# Patient Record
Sex: FEMALE | Race: ASIAN | Employment: UNEMPLOYED | URBAN - METROPOLITAN AREA
[De-identification: names, ages, dates, MRNs, and addresses within clinical notes are randomized per-mention and may not be internally consistent; named-entity substitution may affect disease eponyms.]

---

## 2017-03-18 ENCOUNTER — HOSPITAL ENCOUNTER (OUTPATIENT)
Dept: MAMMOGRAPHY | Facility: HOSPITAL | Age: 42
Discharge: HOME | End: 2017-03-18
Attending: INTERNAL MEDICINE
Payer: COMMERCIAL

## 2017-03-18 DIAGNOSIS — Z12.31 VISIT FOR SCREENING MAMMOGRAM: ICD-10-CM

## 2017-03-18 PROCEDURE — 77063 BREAST TOMOSYNTHESIS BI: CPT

## 2017-03-18 PROCEDURE — 77067 SCR MAMMO BI INCL CAD: CPT

## 2017-10-11 DIAGNOSIS — Z30.41 ORAL CONTRACEPTIVE USE: ICD-10-CM

## 2017-10-13 NOTE — TELEPHONE ENCOUNTER
From: Apoorva Alvarado  Sent: 10/11/2017 11:06 AM CDT  Subject: Medication Renewal Request    Apoorva Alvarado would like a refill of the following medications:     Norethindrone-Eth Estradiol (ZENCHENT) 0.4-35 MG-MCG Oral Tab Chilango Helm MD]    Preferred pha

## 2017-10-13 NOTE — TELEPHONE ENCOUNTER
Rx request for Norethindrone-Eth Estradiol, FAILED Gynecology Protocol. Please review. Thank you.     Gynecology Medications  Protocol Criteria:  · Appointment scheduled in the past 12 months or the next 3 months  · Pap smear in the past 12 months  · Pap sm

## 2018-01-18 DIAGNOSIS — Z30.41 ORAL CONTRACEPTIVE USE: ICD-10-CM

## 2018-01-19 RX ORDER — NORETHINDRONE AND ETHINYL ESTRADIOL 0.4-0.035
KIT ORAL
Qty: 84 TABLET | Refills: 0 | Status: SHIPPED | OUTPATIENT
Start: 2018-01-19 | End: 2018-03-12

## 2018-01-19 NOTE — TELEPHONE ENCOUNTER
Please advise. This was not the medication sent last time.     Gynecology Medications  Protocol Criteria:  · Appointment scheduled in the past 12 months or the next 3 months  · Pap smear in the past 12 months  · Pap smear WNL manually verified  Rece

## 2018-03-12 ENCOUNTER — OFFICE VISIT (OUTPATIENT)
Dept: INTERNAL MEDICINE CLINIC | Facility: CLINIC | Age: 43
End: 2018-03-12

## 2018-03-12 VITALS
WEIGHT: 227 LBS | HEIGHT: 62 IN | HEART RATE: 98 BPM | SYSTOLIC BLOOD PRESSURE: 130 MMHG | DIASTOLIC BLOOD PRESSURE: 87 MMHG | BODY MASS INDEX: 41.77 KG/M2

## 2018-03-12 DIAGNOSIS — Z00.00 ROUTINE HEALTH MAINTENANCE: Primary | ICD-10-CM

## 2018-03-12 DIAGNOSIS — J45.20 MILD INTERMITTENT ASTHMA WITHOUT COMPLICATION: ICD-10-CM

## 2018-03-12 DIAGNOSIS — Z12.31 VISIT FOR SCREENING MAMMOGRAM: ICD-10-CM

## 2018-03-12 DIAGNOSIS — Z30.41 ORAL CONTRACEPTIVE USE: ICD-10-CM

## 2018-03-12 DIAGNOSIS — E55.9 VITAMIN D DEFICIENCY: ICD-10-CM

## 2018-03-12 PROBLEM — J32.0 CHRONIC MAXILLARY SINUSITIS: Status: ACTIVE | Noted: 2018-03-12

## 2018-03-12 PROCEDURE — 99396 PREV VISIT EST AGE 40-64: CPT | Performed by: INTERNAL MEDICINE

## 2018-03-12 RX ORDER — FLUTICASONE PROPIONATE 50 MCG
2 SPRAY, SUSPENSION (ML) NASAL DAILY
Qty: 3 BOTTLE | Refills: 3 | Status: SHIPPED | OUTPATIENT
Start: 2018-03-12 | End: 2020-09-19

## 2018-03-12 RX ORDER — ALBUTEROL SULFATE 90 UG/1
2 AEROSOL, METERED RESPIRATORY (INHALATION) EVERY 6 HOURS PRN
Qty: 1 INHALER | Refills: 3 | Status: SHIPPED | OUTPATIENT
Start: 2018-03-12 | End: 2020-09-19

## 2018-03-12 NOTE — PROGRESS NOTES
HPI:    Patient ID: Elizabeth Oates is a 36year old female. Pt is here for a physical.      She has very mild asthma which is triggered by the cold. Review of Systems   Constitutional: Negative for chills, diaphoresis and fever.    HENT: Negative fo SINUS                ENDOSCOPY ANTROSTOMIES;  Surgeon: Nieves Greenwood MD;  Location: [address] [place] [address]  12/21/2012: NASAL SCOPY,OPEN MAXILL SINUS      Comment: Procedure: STEREOTACTIC IMAGE GUIDANCE, SINUS Comment: Procedure: STEREOTACTIC IMAGE GUIDANCE, SINUS                ENDOSCOPY ANTROSTOMIES;  Surgeon: Taqueria De Oliveira MD;  Location: [address] [place] [address]         Current Outpatient Prescriptions:  Albuterol S distress. She has no wheezes. She has no rales. Right breast exhibits no inverted nipple, no mass, no nipple discharge and no skin change. Left breast exhibits no inverted nipple, no mass, no nipple discharge and no skin change. Abdominal: Soft.  Bowel so Relevant Orders    JACQUELIN SCREENING BILAT (CPT=77067)              Meds This Visit:  Signed Prescriptions Disp Refills    Albuterol Sulfate HFA (PROAIR HFA) 108 (90 Base) MCG/ACT Inhalation Aero Soln 1 Inhaler 3      Sig: Inhale 2 puffs into the lungs every 6

## 2018-03-12 NOTE — PATIENT INSTRUCTIONS
Follow a low carbohydrate diet. Examples of carbohydrates are sweets, bread, rice, potatoes, and pasta. Eat no more than 100 gm of carbs daily.   You should try to eat more salads with lowfat dressing, steamed vegetables, and broiled or grilled chicken

## 2018-03-12 NOTE — ASSESSMENT & PLAN NOTE
Unremarkable exam.  Counseled regarding exercise and healthy diet. PAP was done 2015  Endocervical cells were present. HPV high-risk DNA was negative.

## 2018-03-30 ENCOUNTER — LAB ENCOUNTER (OUTPATIENT)
Dept: LAB | Age: 43
End: 2018-03-30
Attending: INTERNAL MEDICINE
Payer: COMMERCIAL

## 2018-03-30 ENCOUNTER — HOSPITAL ENCOUNTER (OUTPATIENT)
Dept: MAMMOGRAPHY | Age: 43
Discharge: HOME | End: 2018-03-30
Attending: INTERNAL MEDICINE
Payer: COMMERCIAL

## 2018-03-30 DIAGNOSIS — E55.9 VITAMIN D DEFICIENCY: ICD-10-CM

## 2018-03-30 DIAGNOSIS — Z12.31 VISIT FOR SCREENING MAMMOGRAM: ICD-10-CM

## 2018-03-30 DIAGNOSIS — Z00.00 ROUTINE HEALTH MAINTENANCE: ICD-10-CM

## 2018-03-30 DIAGNOSIS — D64.9 ANEMIA, UNSPECIFIED TYPE: ICD-10-CM

## 2018-03-30 PROCEDURE — 77067 SCR MAMMO BI INCL CAD: CPT | Performed by: INTERNAL MEDICINE

## 2018-03-30 PROCEDURE — 80050 GENERAL HEALTH PANEL: CPT

## 2018-03-30 PROCEDURE — 85025 COMPLETE CBC W/AUTO DIFF WBC: CPT

## 2018-03-30 PROCEDURE — 82728 ASSAY OF FERRITIN: CPT

## 2018-03-30 PROCEDURE — 82306 VITAMIN D 25 HYDROXY: CPT

## 2018-03-30 PROCEDURE — 80061 LIPID PANEL: CPT

## 2018-03-30 PROCEDURE — 36415 COLL VENOUS BLD VENIPUNCTURE: CPT

## 2018-03-30 PROCEDURE — 80053 COMPREHEN METABOLIC PANEL: CPT

## 2018-10-25 ENCOUNTER — OFFICE VISIT (OUTPATIENT)
Dept: INTERNAL MEDICINE CLINIC | Facility: CLINIC | Age: 43
End: 2018-10-25
Payer: COMMERCIAL

## 2018-10-25 VITALS
BODY MASS INDEX: 42.99 KG/M2 | HEIGHT: 61 IN | DIASTOLIC BLOOD PRESSURE: 87 MMHG | HEART RATE: 102 BPM | SYSTOLIC BLOOD PRESSURE: 134 MMHG | WEIGHT: 227.69 LBS

## 2018-10-25 DIAGNOSIS — R05.9 COUGH: Primary | ICD-10-CM

## 2018-10-25 PROCEDURE — 99213 OFFICE O/P EST LOW 20 MIN: CPT | Performed by: PHYSICIAN ASSISTANT

## 2018-10-25 PROCEDURE — 99212 OFFICE O/P EST SF 10 MIN: CPT | Performed by: PHYSICIAN ASSISTANT

## 2018-10-25 RX ORDER — BUDESONIDE AND FORMOTEROL FUMARATE DIHYDRATE 80; 4.5 UG/1; UG/1
2 AEROSOL RESPIRATORY (INHALATION) 2 TIMES DAILY
Qty: 1 INHALER | Refills: 0 | Status: SHIPPED | OUTPATIENT
Start: 2018-10-25 | End: 2018-12-15

## 2018-10-25 RX ORDER — BENZONATATE 100 MG/1
100 CAPSULE ORAL 3 TIMES DAILY PRN
Qty: 30 CAPSULE | Refills: 0 | Status: SHIPPED | OUTPATIENT
Start: 2018-10-25 | End: 2018-12-15 | Stop reason: ALTCHOICE

## 2018-10-25 NOTE — PROGRESS NOTES
HPI:    Patient ID: Leigh Ann Iraheta is a 39year old female. HPI   Patient with history of asthma presents today complaining of cough productive of clear mucus for the last 1.5 weeks.  No wheezing or shortness of breath however she does feel chest tightness PHYSICAL EXAM:   Physical Exam   Nursing note and vitals reviewed. Constitutional: She appears well-developed and well-nourished. HENT:   Head: Normocephalic and atraumatic. Right Ear: External ear normal. Tympanic membrane is erythematous.    Left

## 2018-12-05 ENCOUNTER — APPOINTMENT (OUTPATIENT)
Dept: LAB | Age: 43
End: 2018-12-05
Attending: INTERNAL MEDICINE
Payer: COMMERCIAL

## 2018-12-05 DIAGNOSIS — D64.9 ANEMIA, UNSPECIFIED TYPE: ICD-10-CM

## 2018-12-05 PROCEDURE — 36415 COLL VENOUS BLD VENIPUNCTURE: CPT

## 2018-12-05 PROCEDURE — 84466 ASSAY OF TRANSFERRIN: CPT

## 2018-12-05 PROCEDURE — 83540 ASSAY OF IRON: CPT

## 2018-12-15 ENCOUNTER — MED REC SCAN ONLY (OUTPATIENT)
Dept: INTERNAL MEDICINE CLINIC | Facility: CLINIC | Age: 43
End: 2018-12-15

## 2018-12-15 ENCOUNTER — OFFICE VISIT (OUTPATIENT)
Dept: INTERNAL MEDICINE CLINIC | Facility: CLINIC | Age: 43
End: 2018-12-15
Payer: COMMERCIAL

## 2018-12-15 ENCOUNTER — LAB ENCOUNTER (OUTPATIENT)
Dept: LAB | Age: 43
End: 2018-12-15
Attending: INTERNAL MEDICINE
Payer: COMMERCIAL

## 2018-12-15 VITALS
BODY MASS INDEX: 42.48 KG/M2 | SYSTOLIC BLOOD PRESSURE: 131 MMHG | HEART RATE: 98 BPM | DIASTOLIC BLOOD PRESSURE: 83 MMHG | HEIGHT: 61 IN | WEIGHT: 225 LBS

## 2018-12-15 DIAGNOSIS — J45.20 MILD INTERMITTENT ASTHMA WITHOUT COMPLICATION: ICD-10-CM

## 2018-12-15 DIAGNOSIS — R05.9 COUGH: ICD-10-CM

## 2018-12-15 DIAGNOSIS — M77.12 LATERAL EPICONDYLITIS OF LEFT ELBOW: Primary | ICD-10-CM

## 2018-12-15 DIAGNOSIS — D64.9 ANEMIA, UNSPECIFIED TYPE: ICD-10-CM

## 2018-12-15 DIAGNOSIS — Z30.41 ORAL CONTRACEPTIVE USE: ICD-10-CM

## 2018-12-15 PROBLEM — J32.0 CHRONIC MAXILLARY SINUSITIS: Status: RESOLVED | Noted: 2018-03-12 | Resolved: 2018-12-15

## 2018-12-15 PROCEDURE — 36415 COLL VENOUS BLD VENIPUNCTURE: CPT

## 2018-12-15 PROCEDURE — 85025 COMPLETE CBC W/AUTO DIFF WBC: CPT

## 2018-12-15 PROCEDURE — 90471 IMMUNIZATION ADMIN: CPT | Performed by: INTERNAL MEDICINE

## 2018-12-15 PROCEDURE — 99214 OFFICE O/P EST MOD 30 MIN: CPT | Performed by: INTERNAL MEDICINE

## 2018-12-15 PROCEDURE — 90686 IIV4 VACC NO PRSV 0.5 ML IM: CPT | Performed by: INTERNAL MEDICINE

## 2018-12-15 PROCEDURE — 99212 OFFICE O/P EST SF 10 MIN: CPT | Performed by: INTERNAL MEDICINE

## 2018-12-15 RX ORDER — AZITHROMYCIN 250 MG/1
TABLET, FILM COATED ORAL
Qty: 6 TABLET | Refills: 0 | Status: SHIPPED | OUTPATIENT
Start: 2018-12-15 | End: 2019-01-10 | Stop reason: ALTCHOICE

## 2018-12-15 RX ORDER — MONTELUKAST SODIUM 10 MG/1
10 TABLET ORAL DAILY
Qty: 30 TABLET | Refills: 5 | Status: SHIPPED | OUTPATIENT
Start: 2018-12-15 | End: 2019-03-18

## 2018-12-15 NOTE — ASSESSMENT & PLAN NOTE
Patient has developed lateral epicondylitis which is aggravated by her job. I advised her to try to rest her left arm and to use an arm band. I recommend that she take Aleve as needed. I have advised her to follow-up if she does not improve.

## 2018-12-15 NOTE — PROGRESS NOTES
HPI:    Patient ID: Leigh Ann Iraheta is a 39year old female. Pt had a mild anemia. No heavy periods. She is on the OCP. She had a heavy productive cough which is better, but now she has a tickle-type cough. She didn't try the albuterol.   Pt works at a [employer] (CLARITIN) 10 MG Oral Cap Take  by mouth.  Uses either allegra or claritin Disp:  Rfl:        Allergies:No Known Allergies     Social History    Tobacco Use      Smoking status: Never Smoker      Smokeless tobacco: Never Used    Alcohol use: No Medications    Norethindrone-Eth Estradiol (VYFEMLA) 0.4-35 MG-MCG Oral Tab    Mild intermittent asthma without complication     The patient's asthma has been aggravated by her recent upper respiratory infection.   I advised her to use the albuterol more fr

## 2018-12-15 NOTE — ASSESSMENT & PLAN NOTE
The patient has developed a new anemia this year. Her iron level was normal.  We will recheck the CBC today.

## 2018-12-15 NOTE — ASSESSMENT & PLAN NOTE
Patient had an upper respiratory infection 3-4 weeks ago which improved but now she has a residual but persistent cough. This may be due to her asthma however I am going to give her a Z-Marty to see if that helps at all.   I also advised her to use her inhal

## 2018-12-15 NOTE — PATIENT INSTRUCTIONS
Take Aleve (naproxen), 1 tab twice daily, unless it bothers your stomach. Treating Tennis Elbow    Your treatment will depend on how inflamed your tendon is. The goal is to relieve your symptoms and help you regain full use of your elbow.   Rest and me

## 2018-12-15 NOTE — ASSESSMENT & PLAN NOTE
The patient's asthma has been aggravated by her recent upper respiratory infection. I advised her to use the albuterol more frequently to help with her persistent cough. We will add Singulair as needed.

## 2018-12-18 ENCOUNTER — OFFICE VISIT (OUTPATIENT)
Dept: INTERNAL MEDICINE CLINIC | Facility: CLINIC | Age: 43
End: 2018-12-18
Payer: COMMERCIAL

## 2018-12-18 ENCOUNTER — HOSPITAL ENCOUNTER (OUTPATIENT)
Dept: GENERAL RADIOLOGY | Facility: HOSPITAL | Age: 43
Discharge: HOME | End: 2018-12-18
Attending: INTERNAL MEDICINE
Payer: COMMERCIAL

## 2018-12-18 ENCOUNTER — TELEPHONE (OUTPATIENT)
Dept: OTHER | Age: 43
End: 2018-12-18

## 2018-12-18 VITALS
TEMPERATURE: 98 F | DIASTOLIC BLOOD PRESSURE: 87 MMHG | OXYGEN SATURATION: 98 % | BODY MASS INDEX: 42.67 KG/M2 | HEART RATE: 96 BPM | WEIGHT: 226 LBS | SYSTOLIC BLOOD PRESSURE: 133 MMHG | HEIGHT: 61 IN

## 2018-12-18 DIAGNOSIS — R05.9 COUGH: ICD-10-CM

## 2018-12-18 DIAGNOSIS — R05.9 COUGH: Primary | ICD-10-CM

## 2018-12-18 PROCEDURE — 99212 OFFICE O/P EST SF 10 MIN: CPT | Performed by: INTERNAL MEDICINE

## 2018-12-18 PROCEDURE — 71046 X-RAY EXAM CHEST 2 VIEWS: CPT | Performed by: INTERNAL MEDICINE

## 2018-12-18 PROCEDURE — 99213 OFFICE O/P EST LOW 20 MIN: CPT | Performed by: INTERNAL MEDICINE

## 2018-12-18 RX ORDER — FEXOFENADINE HCL AND PSEUDOEPHEDRINE HCI 60; 120 MG/1; MG/1
1 TABLET, EXTENDED RELEASE ORAL 2 TIMES DAILY
Qty: 60 TABLET | Refills: 0 | Status: SHIPPED | OUTPATIENT
Start: 2018-12-18 | End: 2019-11-07 | Stop reason: ALTCHOICE

## 2018-12-18 RX ORDER — BENZONATATE 100 MG/1
100 CAPSULE ORAL AS NEEDED
COMMUNITY
End: 2019-09-04 | Stop reason: ALTCHOICE

## 2018-12-18 RX ORDER — FEXOFENADINE HCL AND PSEUDOEPHEDRINE HCI 60; 120 MG/1; MG/1
1 TABLET, EXTENDED RELEASE ORAL 2 TIMES DAILY
COMMUNITY
End: 2018-12-18

## 2018-12-18 NOTE — PATIENT INSTRUCTIONS
Await results of today's chest x-ray. Continue current medications. Would consider corticosteroid inhaler if cough persists.

## 2018-12-18 NOTE — PROGRESS NOTES
Benjamín Martino is a 39year old female. Patient presents with:  Cough    HPI:   For approximately 3 months, she has had a persistent nonproductive cough. She also notes occasional postnasal drip and a sore throat.   She in addition has noticed occasional mil Drug use: No       EXAM:   GENERAL: Pleasant female appearing well and breathing comfortably in no distress, not actively coughing  /87 (BP Location: Right arm, Patient Position: Sitting, Cuff Size: large)   Pulse 96   Temp 97.8 °F (36.6 °C) (Oral)

## 2018-12-18 NOTE — TELEPHONE ENCOUNTER
Spoke with patient who reports she saw Dr. Randall Zapata 12/15/18 and was started on medication for a cough. Patient reports she is not better and now she is having intermittent wheezing especially when she coughs.  Patient thinks she might have bronchitis and is

## 2019-02-07 DIAGNOSIS — Z30.41 ORAL CONTRACEPTIVE USE: ICD-10-CM

## 2019-02-08 RX ORDER — NORETHINDRONE AND ETHINYL ESTRADIOL 0.4-0.035
KIT ORAL
Qty: 84 TABLET | Refills: 0 | OUTPATIENT
Start: 2019-02-08

## 2019-03-18 ENCOUNTER — OFFICE VISIT (OUTPATIENT)
Dept: INTERNAL MEDICINE CLINIC | Facility: CLINIC | Age: 44
End: 2019-03-18
Payer: COMMERCIAL

## 2019-03-18 VITALS
SYSTOLIC BLOOD PRESSURE: 127 MMHG | RESPIRATION RATE: 20 BRPM | TEMPERATURE: 98 F | BODY MASS INDEX: 42.69 KG/M2 | HEIGHT: 61 IN | HEART RATE: 99 BPM | DIASTOLIC BLOOD PRESSURE: 84 MMHG | WEIGHT: 226.13 LBS

## 2019-03-18 DIAGNOSIS — D64.9 ANEMIA, UNSPECIFIED TYPE: ICD-10-CM

## 2019-03-18 DIAGNOSIS — J45.20 MILD INTERMITTENT ASTHMA WITHOUT COMPLICATION: ICD-10-CM

## 2019-03-18 DIAGNOSIS — E55.9 VITAMIN D DEFICIENCY: ICD-10-CM

## 2019-03-18 DIAGNOSIS — Z30.41 ORAL CONTRACEPTIVE USE: ICD-10-CM

## 2019-03-18 DIAGNOSIS — E66.01 MORBID OBESITY DUE TO EXCESS CALORIES (HCC): ICD-10-CM

## 2019-03-18 DIAGNOSIS — Z00.00 ROUTINE HEALTH MAINTENANCE: Primary | ICD-10-CM

## 2019-03-18 DIAGNOSIS — Z12.39 BREAST CANCER SCREENING: ICD-10-CM

## 2019-03-18 PROBLEM — M77.12 LATERAL EPICONDYLITIS OF LEFT ELBOW: Status: RESOLVED | Noted: 2018-12-15 | Resolved: 2019-03-18

## 2019-03-18 PROCEDURE — 99212 OFFICE O/P EST SF 10 MIN: CPT | Performed by: INTERNAL MEDICINE

## 2019-03-18 PROCEDURE — 99396 PREV VISIT EST AGE 40-64: CPT | Performed by: INTERNAL MEDICINE

## 2019-03-18 PROCEDURE — 99213 OFFICE O/P EST LOW 20 MIN: CPT | Performed by: INTERNAL MEDICINE

## 2019-03-18 RX ORDER — MONTELUKAST SODIUM 10 MG/1
10 TABLET ORAL DAILY
Qty: 30 TABLET | Refills: 5 | Status: SHIPPED | OUTPATIENT
Start: 2019-03-18 | End: 2020-03-12

## 2019-03-18 NOTE — PROGRESS NOTES
HPI:    Patient ID: Carmina Gage is a 39year old female. Pt is here for a physical.    She has a little bulge on the top of her right knee and it is a little tender. The singulair helped her cough. Her asthma is well controlled.   Pt is taking Vitamin is not nervous/anxious.         Past Surgical History:   Procedure Laterality Date   • APPENDECTOMY     • COLONOSCOPY     • D & C     • STEREOTACTIC IMAGE GUIDANCE, SINUS ENDOSCOPY ANTROSTOMIES Bilateral 12/21/2012    Performed by Michael Clay Normocephalic and atraumatic.    Right Ear: Tympanic membrane normal.   Left Ear: Tympanic membrane normal.   Nose: Nose normal.   Mouth/Throat: Oropharynx is clear and moist.   Eyes: Conjunctivae and EOM are normal. Pupils are equal, round, and reactive to 5000 units daily. Will check level.          Relevant Orders    VITAMIN D, 25-HYDROXY    Oral contraceptive use    Relevant Medications    Norethindrone-Eth Estradiol (VYFEMLA) 0.4-35 MG-MCG Oral Tab    Mild intermittent asthma without complication     Con

## 2019-03-18 NOTE — ASSESSMENT & PLAN NOTE
Unremarkable exam.  Counseled regarding exercise and healthy diet. Labs reviewed with pt. PAP was done in 2015. Due next year.

## 2019-09-04 ENCOUNTER — OFFICE VISIT (OUTPATIENT)
Dept: INTERNAL MEDICINE CLINIC | Facility: CLINIC | Age: 44
End: 2019-09-04
Payer: COMMERCIAL

## 2019-09-04 VITALS
DIASTOLIC BLOOD PRESSURE: 86 MMHG | BODY MASS INDEX: 40.97 KG/M2 | HEIGHT: 61 IN | TEMPERATURE: 98 F | SYSTOLIC BLOOD PRESSURE: 121 MMHG | WEIGHT: 217 LBS | HEART RATE: 75 BPM

## 2019-09-04 DIAGNOSIS — J02.9 SORE THROAT: Primary | ICD-10-CM

## 2019-09-04 DIAGNOSIS — H92.01 RIGHT EAR PAIN: ICD-10-CM

## 2019-09-04 DIAGNOSIS — J02.9 ACUTE PHARYNGITIS, UNSPECIFIED ETIOLOGY: ICD-10-CM

## 2019-09-04 PROBLEM — J06.9 URI (UPPER RESPIRATORY INFECTION): Status: ACTIVE | Noted: 2019-09-04

## 2019-09-04 LAB
CONTROL LINE PRESENT WITH A CLEAR BACKGROUND (YES/NO): YES YES/NO
KIT LOT #: NORMAL NUMERIC
STREP GRP A CUL-SCR: NEGATIVE

## 2019-09-04 PROCEDURE — 99213 OFFICE O/P EST LOW 20 MIN: CPT | Performed by: NURSE PRACTITIONER

## 2019-09-04 PROCEDURE — 87880 STREP A ASSAY W/OPTIC: CPT | Performed by: NURSE PRACTITIONER

## 2019-09-04 RX ORDER — AZITHROMYCIN 250 MG/1
TABLET, FILM COATED ORAL
Qty: 6 TABLET | Refills: 0 | Status: SHIPPED | OUTPATIENT
Start: 2019-09-04 | End: 2019-11-07 | Stop reason: ALTCHOICE

## 2019-09-04 NOTE — PROGRESS NOTES
HPI:    Patient ID: Luis Paez is a 39year old female. HPI Patient here for sore throat pain, swollen glands and right ear pain since Friday. Pain in her right side of her throat is at a 2/10. Pain is intermittent.  Pain is mild with increased fatig MCG/ACT Inhalation Aero Soln Inhale 2 puffs into the lungs every 6 (six) hours as needed for Wheezing. Disp: 1 Inhaler Rfl: 3   Fluticasone Propionate 50 MCG/ACT Nasal Suspension 2 sprays by Nasal route daily.  Uses seasonally only Disp: 3 Bottle Rfl: 3 Relevant Medications    azithromycin (ZITHROMAX Z-RAHAT) 250 MG Oral Tab      Other Visit Diagnoses     Sore throat    -  Primary    Relevant Medications    azithromycin (ZITHROMAX Z-RAHAT) 250 MG Oral Tab    Other Relevant Orders    STREP A ASSAY W/OPTIC (Com

## 2019-09-04 NOTE — ASSESSMENT & PLAN NOTE
A/P Patient with upper respiratory infection complaining of sore throat pain 2/10, swollen glands, and increased fatigue. Her  is receiving chemotherapy and she does not want to get him sick.  She appears fatigued, pain is intermittent in her throat

## 2019-10-19 ENCOUNTER — LAB ENCOUNTER (OUTPATIENT)
Dept: LAB | Age: 44
End: 2019-10-19
Attending: INTERNAL MEDICINE
Payer: COMMERCIAL

## 2019-10-19 DIAGNOSIS — D64.9 ANEMIA, UNSPECIFIED TYPE: ICD-10-CM

## 2019-10-19 DIAGNOSIS — Z00.00 ROUTINE HEALTH MAINTENANCE: ICD-10-CM

## 2019-10-19 PROCEDURE — 36415 COLL VENOUS BLD VENIPUNCTURE: CPT

## 2019-10-19 PROCEDURE — 80053 COMPREHEN METABOLIC PANEL: CPT

## 2019-10-19 PROCEDURE — 83540 ASSAY OF IRON: CPT

## 2019-10-19 PROCEDURE — 82306 VITAMIN D 25 HYDROXY: CPT | Performed by: INTERNAL MEDICINE

## 2019-10-19 PROCEDURE — 84466 ASSAY OF TRANSFERRIN: CPT

## 2019-10-19 PROCEDURE — 84443 ASSAY THYROID STIM HORMONE: CPT

## 2019-10-19 PROCEDURE — 82728 ASSAY OF FERRITIN: CPT

## 2019-10-19 PROCEDURE — 80061 LIPID PANEL: CPT

## 2019-10-19 PROCEDURE — 85025 COMPLETE CBC W/AUTO DIFF WBC: CPT

## 2019-11-06 ENCOUNTER — NURSE TRIAGE (OUTPATIENT)
Dept: INTERNAL MEDICINE CLINIC | Facility: CLINIC | Age: 44
End: 2019-11-06

## 2019-11-06 NOTE — TELEPHONE ENCOUNTER
Action Requested: Summary for Provider     []  Critical Lab, Recommendations Needed  [] Need Additional Advice  [x]   FYI    []   Need Orders  [] Need Medications Sent to Pharmacy  []  Other     SUMMARY: Patient has been having low grade fever since about

## 2019-11-07 ENCOUNTER — OFFICE VISIT (OUTPATIENT)
Dept: INTERNAL MEDICINE CLINIC | Facility: CLINIC | Age: 44
End: 2019-11-07
Payer: COMMERCIAL

## 2019-11-07 ENCOUNTER — HOSPITAL ENCOUNTER (OUTPATIENT)
Dept: GENERAL RADIOLOGY | Facility: HOSPITAL | Age: 44
Discharge: HOME | End: 2019-11-07
Attending: INTERNAL MEDICINE
Payer: COMMERCIAL

## 2019-11-07 ENCOUNTER — LAB ENCOUNTER (OUTPATIENT)
Dept: LAB | Facility: HOSPITAL | Age: 44
End: 2019-11-07
Attending: INTERNAL MEDICINE
Payer: COMMERCIAL

## 2019-11-07 VITALS
TEMPERATURE: 98 F | DIASTOLIC BLOOD PRESSURE: 86 MMHG | WEIGHT: 220.19 LBS | BODY MASS INDEX: 41.57 KG/M2 | HEIGHT: 61 IN | HEART RATE: 114 BPM | SYSTOLIC BLOOD PRESSURE: 122 MMHG

## 2019-11-07 DIAGNOSIS — D64.9 ANEMIA, UNSPECIFIED TYPE: ICD-10-CM

## 2019-11-07 DIAGNOSIS — R50.9 ELEVATED TEMPERATURE: Primary | ICD-10-CM

## 2019-11-07 DIAGNOSIS — R50.9 ELEVATED TEMPERATURE: ICD-10-CM

## 2019-11-07 DIAGNOSIS — R53.83 FATIGUE, UNSPECIFIED TYPE: ICD-10-CM

## 2019-11-07 PROBLEM — J06.9 URI (UPPER RESPIRATORY INFECTION): Status: RESOLVED | Noted: 2019-09-04 | Resolved: 2019-11-07

## 2019-11-07 PROBLEM — R05.9 COUGH: Status: RESOLVED | Noted: 2018-12-15 | Resolved: 2019-11-07

## 2019-11-07 PROCEDURE — 36415 COLL VENOUS BLD VENIPUNCTURE: CPT

## 2019-11-07 PROCEDURE — 83021 HEMOGLOBIN CHROMOTOGRAPHY: CPT

## 2019-11-07 PROCEDURE — 71046 X-RAY EXAM CHEST 2 VIEWS: CPT | Performed by: INTERNAL MEDICINE

## 2019-11-07 PROCEDURE — 99214 OFFICE O/P EST MOD 30 MIN: CPT | Performed by: INTERNAL MEDICINE

## 2019-11-07 PROCEDURE — 84443 ASSAY THYROID STIM HORMONE: CPT

## 2019-11-07 PROCEDURE — 85025 COMPLETE CBC W/AUTO DIFF WBC: CPT

## 2019-11-07 PROCEDURE — 83020 HEMOGLOBIN ELECTROPHORESIS: CPT

## 2019-11-07 PROCEDURE — 86334 IMMUNOFIX E-PHORESIS SERUM: CPT

## 2019-11-07 PROCEDURE — 83036 HEMOGLOBIN GLYCOSYLATED A1C: CPT

## 2019-11-07 PROCEDURE — 84165 PROTEIN E-PHORESIS SERUM: CPT

## 2019-11-07 NOTE — PROGRESS NOTES
HPI:    Patient ID: Fauzia Clarke is a 38year old female. She has had a temp up to 100 for the past month, palpitations, and fatigue. The patient's  has cancer and is undergoing treatment.   Patient is stressed about that and is also concerned th [facility]          Current Outpatient Medications   Medication Sig Dispense Refill   • OMEPRAZOLE 20 MG Oral Capsule Delayed Release TAKE 1 CAPSULE(20 MG) BY MOUTH DAILY 90 capsule 3   • Montelukast Sodium (SINGULAIR) 10 MG Oral Tab Take 1 table normal and breath sounds normal. No respiratory distress. She has no wheezes. She has no rales. Abdominal: Soft. There is no tenderness. Lymphadenopathy:     She has no cervical adenopathy.    Neurological: She is alert and oriented to person, place, an

## 2019-11-08 NOTE — ASSESSMENT & PLAN NOTE
The patient has a mild anemia. She says this is chronic although previous to this it was at the low end of the normal range. Patient may have thalassemia trait. I do not have her old records.

## 2019-11-08 NOTE — ASSESSMENT & PLAN NOTE
Patient complains of fatigue. We will check basic labs. Her fatigue may be due to stress with concerns over her 's health. Follow-up in 4 to 6 weeks.

## 2019-11-08 NOTE — ASSESSMENT & PLAN NOTE
I advised the patient to check her temperature twice a day and record. We will check some labs and a chest x-ray. Patient will call if she has additional symptoms. Follow-up in 4 to 6 weeks.

## 2019-11-13 ENCOUNTER — HOSPITAL ENCOUNTER (OUTPATIENT)
Dept: MAMMOGRAPHY | Age: 44
Discharge: HOME | End: 2019-11-13
Attending: INTERNAL MEDICINE
Payer: COMMERCIAL

## 2019-11-13 DIAGNOSIS — Z12.39 BREAST CANCER SCREENING: ICD-10-CM

## 2019-11-13 PROCEDURE — 77063 BREAST TOMOSYNTHESIS BI: CPT | Performed by: INTERNAL MEDICINE

## 2019-11-13 PROCEDURE — 77067 SCR MAMMO BI INCL CAD: CPT | Performed by: INTERNAL MEDICINE

## 2019-11-20 ENCOUNTER — HOSPITAL ENCOUNTER (OUTPATIENT)
Dept: MAMMOGRAPHY | Facility: HOSPITAL | Age: 44
Discharge: HOME | End: 2019-11-20
Attending: INTERNAL MEDICINE
Payer: COMMERCIAL

## 2019-11-20 ENCOUNTER — HOSPITAL ENCOUNTER (OUTPATIENT)
Dept: ULTRASOUND IMAGING | Facility: HOSPITAL | Age: 44
Discharge: HOME | End: 2019-11-20
Attending: INTERNAL MEDICINE
Payer: COMMERCIAL

## 2019-11-20 DIAGNOSIS — R92.8 ABNORMAL MAMMOGRAM: ICD-10-CM

## 2019-11-20 PROCEDURE — 77062 BREAST TOMOSYNTHESIS BI: CPT | Performed by: INTERNAL MEDICINE

## 2019-11-20 PROCEDURE — 76642 ULTRASOUND BREAST LIMITED: CPT | Performed by: INTERNAL MEDICINE

## 2019-11-20 PROCEDURE — 77066 DX MAMMO INCL CAD BI: CPT | Performed by: INTERNAL MEDICINE

## 2020-06-10 DIAGNOSIS — Z30.41 ORAL CONTRACEPTIVE USE: ICD-10-CM

## 2020-06-12 DIAGNOSIS — Z30.41 ORAL CONTRACEPTIVE USE: ICD-10-CM

## 2020-06-12 RX ORDER — NORETHINDRONE AND ETHINYL ESTRADIOL 0.4-0.035
KIT ORAL
Qty: 28 TABLET | Refills: 0 | Status: SHIPPED | OUTPATIENT
Start: 2020-06-12 | End: 2020-10-23

## 2020-06-12 RX ORDER — NORETHINDRONE AND ETHINYL ESTRADIOL 0.4-0.035
KIT ORAL
Qty: 84 TABLET | Refills: 0 | OUTPATIENT
Start: 2020-06-12

## 2020-06-12 RX ORDER — NORETHINDRONE AND ETHINYL ESTRADIOL 0.4-0.035
KIT ORAL
Qty: 28 TABLET | Refills: 0 | Status: SHIPPED | OUTPATIENT
Start: 2020-06-12 | End: 2020-06-12

## 2020-06-12 NOTE — TELEPHONE ENCOUNTER
Spoke with patient (name and  verified). Informed of message below. States medication is on auto refill. Video visit scheduled for 6/15/2020 1 pm.  Refused office visit. Gave patient the number for Central Scheduling [phone] to schedule Mammogram.    Routed as JAYE.

## 2020-06-12 NOTE — TELEPHONE ENCOUNTER
Please call pt. The pharmacy keeps sending this. Please ask pt to read her message. She is due for a diagnostic mammogram.  It is important that she schedule this. I do not want to fill a 3 month supply of hormones in case there is something on the mammogram.  She is also due for an appointment with me.

## 2020-06-15 ENCOUNTER — TELEMEDICINE (OUTPATIENT)
Dept: INTERNAL MEDICINE CLINIC | Facility: CLINIC | Age: 45
End: 2020-06-15

## 2020-06-15 DIAGNOSIS — E55.9 VITAMIN D DEFICIENCY: ICD-10-CM

## 2020-06-15 DIAGNOSIS — N92.6 IRREGULAR MENSES: ICD-10-CM

## 2020-06-15 DIAGNOSIS — R92.8 ABNORMAL MAMMOGRAM: Primary | ICD-10-CM

## 2020-06-15 DIAGNOSIS — F43.21 GRIEF: ICD-10-CM

## 2020-06-15 DIAGNOSIS — Z00.00 ROUTINE HEALTH MAINTENANCE: ICD-10-CM

## 2020-06-15 DIAGNOSIS — G43.009 MIGRAINE WITHOUT AURA AND WITHOUT STATUS MIGRAINOSUS, NOT INTRACTABLE: ICD-10-CM

## 2020-06-15 PROBLEM — R50.9 ELEVATED TEMPERATURE: Status: RESOLVED | Noted: 2019-11-07 | Resolved: 2020-06-15

## 2020-06-15 PROCEDURE — 99214 OFFICE O/P EST MOD 30 MIN: CPT | Performed by: INTERNAL MEDICINE

## 2020-06-15 NOTE — ASSESSMENT & PLAN NOTE
Patient has been on oral contraceptives for many years because of irregular periods. I suggested to her that she might want to try going off of these for several reasons.   First she is morbidly obese and also has migraines and that increases her risk of b

## 2020-06-15 NOTE — PROGRESS NOTES
Video Progress Note  Telehealth Verbal Consent   I conducted a telehealth visit with Maritza Hair today, 06/15/20, which was completed using two-way, real-time interactive audio and video communication.  This has been done in good eduardo to provide contin problems. Menstrual Problem   The patient's primary symptoms include vaginal bleeding. This is a chronic problem. The current episode started more than 1 year ago. The problem occurs intermittently. The problem has been waxing and waning.  Associated sym is not nervous/anxious. .There were no vitals taken for this visit. PHYSICAL EXAM:   Physical Exam   Nursing note and vitals reviewed. Constitutional: She is oriented to person, place, and time. She appears well-developed and well-nourished.  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 Patient is experiencing normal grief. She gets support from her family and her Mandaen.                  The patient expressed understanding and agreed wit

## 2020-06-15 NOTE — ASSESSMENT & PLAN NOTE
Patient has several types of headaches. She has frequent headaches now due to her allergies. However I advised her that if she does have migraines this does increase the risk of stroke with birth control pills.

## 2020-06-15 NOTE — PATIENT INSTRUCTIONS
See the gynecologist [phone]    Please schedule your next appointment in late October. Do fasting labs 2-4 days before that appointment. Fast for 12 hours. Water and meds are okay. Walk in.

## 2020-06-15 NOTE — ASSESSMENT & PLAN NOTE
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 Patient is experiencing normal grief. She gets support from her family and her Rastafari.

## 2020-06-23 ENCOUNTER — HOSPITAL ENCOUNTER (OUTPATIENT)
Dept: MAMMOGRAPHY | Facility: HOSPITAL | Age: 45
Discharge: HOME | End: 2020-06-23
Attending: INTERNAL MEDICINE
Payer: COMMERCIAL

## 2020-06-23 DIAGNOSIS — R92.8 ABNORMAL MAMMOGRAM: ICD-10-CM

## 2020-06-23 PROCEDURE — 77061 BREAST TOMOSYNTHESIS UNI: CPT | Performed by: INTERNAL MEDICINE

## 2020-06-23 PROCEDURE — 77065 DX MAMMO INCL CAD UNI: CPT | Performed by: INTERNAL MEDICINE

## 2020-09-18 ENCOUNTER — PATIENT MESSAGE (OUTPATIENT)
Dept: INTERNAL MEDICINE CLINIC | Facility: CLINIC | Age: 45
End: 2020-09-18

## 2020-09-18 DIAGNOSIS — J45.20 MILD INTERMITTENT ASTHMA WITHOUT COMPLICATION: ICD-10-CM

## 2020-09-19 RX ORDER — FLUTICASONE PROPIONATE 50 MCG
2 SPRAY, SUSPENSION (ML) NASAL DAILY
Qty: 3 BOTTLE | Refills: 1 | Status: SHIPPED | OUTPATIENT
Start: 2020-09-19

## 2020-09-19 RX ORDER — ALBUTEROL SULFATE 90 UG/1
2 AEROSOL, METERED RESPIRATORY (INHALATION) EVERY 6 HOURS PRN
Qty: 3 INHALER | Refills: 1 | Status: SHIPPED | OUTPATIENT
Start: 2020-09-19

## 2020-09-19 NOTE — TELEPHONE ENCOUNTER
From: Ferny Tidwell  To: Stephanie Castellanos MD  Sent: 2020 3:26 PM CDT  Subject: Prescription Question    [facility] Doctor Charlee Tabares,  I hope you are well.  I needed to get a refill on my inhaler (PROAIR HFA ORAL INH) 200 PFS 8.5 G   my current one has

## 2020-09-19 NOTE — TELEPHONE ENCOUNTER
Routed to Dr Lon Allen for advise, thanks.     Requested Prescriptions     Pending Prescriptions Disp Refills   • Albuterol Sulfate HFA (PROAIR HFA) 108 (90 Base) MCG/ACT Inhalation Aero Soln 1 Inhaler 3     Sig: Inhale 2 puffs into the lungs every 6 (six) ho

## 2020-10-23 ENCOUNTER — OFFICE VISIT (OUTPATIENT)
Dept: OBGYN CLINIC | Facility: CLINIC | Age: 45
End: 2020-10-23
Payer: COMMERCIAL

## 2020-10-23 VITALS
DIASTOLIC BLOOD PRESSURE: 75 MMHG | HEART RATE: 86 BPM | SYSTOLIC BLOOD PRESSURE: 109 MMHG | WEIGHT: 215 LBS | BODY MASS INDEX: 41 KG/M2

## 2020-10-23 DIAGNOSIS — Z01.419 ENCOUNTER FOR GYNECOLOGICAL EXAMINATION: Primary | ICD-10-CM

## 2020-10-23 DIAGNOSIS — Z12.4 SCREENING FOR MALIGNANT NEOPLASM OF CERVIX: ICD-10-CM

## 2020-10-23 PROCEDURE — 3078F DIAST BP <80 MM HG: CPT | Performed by: OBSTETRICS & GYNECOLOGY

## 2020-10-23 PROCEDURE — 99386 PREV VISIT NEW AGE 40-64: CPT | Performed by: OBSTETRICS & GYNECOLOGY

## 2020-10-23 PROCEDURE — 3074F SYST BP LT 130 MM HG: CPT | Performed by: OBSTETRICS & GYNECOLOGY

## 2020-10-23 NOTE — PROGRESS NOTES
Vivien Teran is a 54year old female [id number] Patient's last menstrual period was 10/05/2020. here for annual exam.       New pt. Stopped ocp 4 months ago since pt's   4 months ago. Menses q month. Last mammogram 2020.   Will need to breath  Gastrointestinal:  denies heartburn, abdominal pain, diarrhea or constipation  Genitourinary:  denies dysuria, incontinence, abnormal vaginal discharge, vaginal itching  Musculoskeletal:  denies back pain.   Skin/Breast:  Denies any breast pain, lum this encounter         Piter Virgen MD  10/23/2020  2:53 PM

## 2020-12-01 ENCOUNTER — HOSPITAL ENCOUNTER (OUTPATIENT)
Dept: MAMMOGRAPHY | Facility: HOSPITAL | Age: 45
Discharge: HOME | End: 2020-12-01
Attending: INTERNAL MEDICINE
Payer: COMMERCIAL

## 2020-12-01 DIAGNOSIS — R92.8 ABNORMAL MAMMOGRAM: ICD-10-CM

## 2020-12-01 PROCEDURE — 77062 BREAST TOMOSYNTHESIS BI: CPT | Performed by: INTERNAL MEDICINE

## 2020-12-01 PROCEDURE — 77066 DX MAMMO INCL CAD BI: CPT | Performed by: INTERNAL MEDICINE

## 2021-03-24 ENCOUNTER — TELEPHONE (OUTPATIENT)
Dept: INTERNAL MEDICINE CLINIC | Facility: CLINIC | Age: 46
End: 2021-03-24

## 2021-03-24 NOTE — TELEPHONE ENCOUNTER
Patient asking for a letter stating she is eligible for getting the Covid vaccine due to her comorbidities. States she will bring letter to pharmacy once this vaccination is available to her. Letter pended.

## 2021-04-06 ENCOUNTER — IMMUNIZATION (OUTPATIENT)
Dept: LAB | Facility: HOSPITAL | Age: 46
End: 2021-04-06
Attending: HOSPITALIST
Payer: COMMERCIAL

## 2021-04-06 DIAGNOSIS — Z23 NEED FOR VACCINATION: Primary | ICD-10-CM

## 2021-04-06 PROCEDURE — 0011A SARSCOV2 VAC 100MCG/0.5ML IM: CPT

## 2021-05-04 ENCOUNTER — IMMUNIZATION (OUTPATIENT)
Dept: LAB | Facility: HOSPITAL | Age: 46
End: 2021-05-04
Attending: EMERGENCY MEDICINE
Payer: COMMERCIAL

## 2021-05-04 DIAGNOSIS — Z23 NEED FOR VACCINATION: Primary | ICD-10-CM

## 2021-05-04 PROCEDURE — 0012A SARSCOV2 VAC 100MCG/0.5ML IM: CPT

## 2021-05-08 ENCOUNTER — NURSE TRIAGE (OUTPATIENT)
Dept: INTERNAL MEDICINE CLINIC | Facility: CLINIC | Age: 46
End: 2021-05-08

## 2021-05-08 ENCOUNTER — TELEMEDICINE (OUTPATIENT)
Dept: FAMILY MEDICINE CLINIC | Facility: CLINIC | Age: 46
End: 2021-05-08
Payer: COMMERCIAL

## 2021-05-08 DIAGNOSIS — R05.9 COUGH: Primary | ICD-10-CM

## 2021-05-08 PROCEDURE — 99213 OFFICE O/P EST LOW 20 MIN: CPT | Performed by: FAMILY MEDICINE

## 2021-05-08 RX ORDER — PREDNISONE 10 MG/1
TABLET ORAL
Qty: 18 TABLET | Refills: 0 | Status: SHIPPED | OUTPATIENT
Start: 2021-05-08 | End: 2021-06-08 | Stop reason: ALTCHOICE

## 2021-05-08 RX ORDER — MONTELUKAST SODIUM 10 MG/1
10 TABLET ORAL DAILY
Qty: 30 TABLET | Refills: 0 | Status: SHIPPED | OUTPATIENT
Start: 2021-05-08 | End: 2021-05-11

## 2021-05-08 NOTE — TELEPHONE ENCOUNTER
Action Requested: Summary for Provider     []  Critical Lab, Recommendations Needed  [] Need Additional Advice  [x]   FYI    []   Need Orders  [] Need Medications Sent to Pharmacy  []  Other     SUMMARY: Patient calling with complaint of cough with wheezin

## 2021-05-11 RX ORDER — MONTELUKAST SODIUM 10 MG/1
TABLET ORAL
Qty: 90 TABLET | Refills: 0 | Status: SHIPPED | OUTPATIENT
Start: 2021-05-11 | End: 2021-06-08

## 2021-06-08 ENCOUNTER — OFFICE VISIT (OUTPATIENT)
Dept: INTERNAL MEDICINE CLINIC | Facility: CLINIC | Age: 46
End: 2021-06-08
Payer: COMMERCIAL

## 2021-06-08 VITALS
HEIGHT: 61 IN | BODY MASS INDEX: 42.46 KG/M2 | RESPIRATION RATE: 18 BRPM | WEIGHT: 224.88 LBS | DIASTOLIC BLOOD PRESSURE: 74 MMHG | HEART RATE: 96 BPM | SYSTOLIC BLOOD PRESSURE: 106 MMHG

## 2021-06-08 DIAGNOSIS — J30.9 ALLERGIC RHINITIS, UNSPECIFIED SEASONALITY, UNSPECIFIED TRIGGER: ICD-10-CM

## 2021-06-08 DIAGNOSIS — J45.30 MILD PERSISTENT ASTHMA WITHOUT COMPLICATION: Primary | ICD-10-CM

## 2021-06-08 PROCEDURE — 3008F BODY MASS INDEX DOCD: CPT | Performed by: INTERNAL MEDICINE

## 2021-06-08 PROCEDURE — 99214 OFFICE O/P EST MOD 30 MIN: CPT | Performed by: INTERNAL MEDICINE

## 2021-06-08 PROCEDURE — 3074F SYST BP LT 130 MM HG: CPT | Performed by: INTERNAL MEDICINE

## 2021-06-08 PROCEDURE — 3078F DIAST BP <80 MM HG: CPT | Performed by: INTERNAL MEDICINE

## 2021-06-08 RX ORDER — MONTELUKAST SODIUM 10 MG/1
10 TABLET ORAL DAILY
Qty: 90 TABLET | Refills: 1 | Status: SHIPPED | OUTPATIENT
Start: 2021-06-08

## 2021-06-08 NOTE — PROGRESS NOTES
HPI:    Patient ID: Kadi Olmstead is a 54year old female. HPI:  Pt's asthma has been bad. She had a video appointment with another doctor and was given oral steroids which helped, but now her asthma is bad again.     Past Surgical History:   Procedur Procedure: STEREOTACTIC IMAGE GUIDANCE, SINUS ENDOSCOPY ANTROSTOMIES;  Surgeon: Bereket Modi MD;  Location: [address] [place] [address]   • NASAL/SINUS SCOPY,V MIKI BULL  12/21/2012    Procedure: STEREOTACTIC IMAGE GUIDANCE, SINUS ENDOSC ./74 (BP Location: Right arm, Patient Position: Sitting, Cuff Size: large)   Pulse 96   Resp 18   Ht 5' 1\" (1.549 m)   Wt 224 lb 14.4 oz (102 kg)   LMP 05/20/2021 (Within Days)   Breastfeeding No   BMI 42.49 kg/m²   PHYSICAL EXAM:   Physical Exa

## 2021-06-08 NOTE — PATIENT INSTRUCTIONS
Do fasting labs soon. Fast for 12 hours. Water is okay. You can walk-in or schedule an appointment.   Do not take vitamins or supplements for 3 days prior to the blood test.

## 2021-06-11 RX ORDER — MONTELUKAST SODIUM 10 MG/1
10 TABLET ORAL DAILY
Qty: 90 TABLET | Refills: 0 | OUTPATIENT
Start: 2021-06-11

## 2021-08-24 ENCOUNTER — TELEPHONE (OUTPATIENT)
Dept: INTERNAL MEDICINE CLINIC | Facility: CLINIC | Age: 46
End: 2021-08-24

## 2021-08-24 DIAGNOSIS — Z00.00 ROUTINE HEALTH MAINTENANCE: Primary | ICD-10-CM

## 2021-08-24 DIAGNOSIS — E55.9 VITAMIN D DEFICIENCY: ICD-10-CM

## 2021-08-24 NOTE — TELEPHONE ENCOUNTER
Pt calling requesting if she can have refil for Montelukast Sodium (SINGULAIR) 10 MG Oral Tab    And If it could be a monthly refill. Please advise.

## 2021-08-24 NOTE — TELEPHONE ENCOUNTER
Pt called stating due to insurance issues she never got a chance to do the labs from 6/15/2020 that Dr order for her. She is requesting if she can have new orders for those labs. Please advise.

## 2021-08-25 NOTE — TELEPHONE ENCOUNTER
Lab Order Update  Message [id number]  From   Fanny Horta RN To   Joshua Self and Delivered   8/24/2021  9:28 PM   Last Read in [address]   8/24/2021 11:00 PM by Kadi Olmstead

## 2021-08-25 NOTE — TELEPHONE ENCOUNTER
Ratify message sent to patient, please verify it is viewed. If not, please call patient with the update.

## 2021-08-25 NOTE — TELEPHONE ENCOUNTER
Patient was informed of message. Disp Refills Start End     Montelukast Sodium 10 MG Oral Tab 90 tablet 1 6/8/2021     Sig - Route:  Take 1 tablet (10 mg total) by mouth daily. - Oral    Sent to pharmacy as: Montelukast Sodium 10 MG Oral Tablet (DARI

## 2021-10-09 ENCOUNTER — LAB ENCOUNTER (OUTPATIENT)
Dept: LAB | Facility: HOSPITAL | Age: 46
End: 2021-10-09
Attending: INTERNAL MEDICINE
Payer: COMMERCIAL

## 2021-10-09 DIAGNOSIS — Z00.00 ROUTINE HEALTH MAINTENANCE: ICD-10-CM

## 2021-10-09 DIAGNOSIS — E55.9 VITAMIN D DEFICIENCY: ICD-10-CM

## 2021-10-09 PROCEDURE — 82306 VITAMIN D 25 HYDROXY: CPT

## 2021-10-09 PROCEDURE — 84443 ASSAY THYROID STIM HORMONE: CPT

## 2021-10-09 PROCEDURE — 85025 COMPLETE CBC W/AUTO DIFF WBC: CPT

## 2021-10-09 PROCEDURE — 83036 HEMOGLOBIN GLYCOSYLATED A1C: CPT

## 2021-10-09 PROCEDURE — 80061 LIPID PANEL: CPT

## 2021-10-09 PROCEDURE — 36415 COLL VENOUS BLD VENIPUNCTURE: CPT

## 2021-10-09 PROCEDURE — 80053 COMPREHEN METABOLIC PANEL: CPT

## 2022-02-05 ENCOUNTER — IMMUNIZATION (OUTPATIENT)
Dept: LAB | Age: 47
End: 2022-02-05
Attending: EMERGENCY MEDICINE
Payer: COMMERCIAL

## 2022-02-05 ENCOUNTER — HOSPITAL ENCOUNTER (OUTPATIENT)
Dept: MAMMOGRAPHY | Facility: HOSPITAL | Age: 47
Discharge: HOME | End: 2022-02-05
Attending: INTERNAL MEDICINE
Payer: COMMERCIAL

## 2022-02-05 ENCOUNTER — APPOINTMENT (OUTPATIENT)
Dept: LAB | Age: 47
End: 2022-02-05
Attending: EMERGENCY MEDICINE
Payer: COMMERCIAL

## 2022-02-05 DIAGNOSIS — Z12.31 BREAST CANCER SCREENING BY MAMMOGRAM: ICD-10-CM

## 2022-02-05 DIAGNOSIS — Z23 NEED FOR VACCINATION: Primary | ICD-10-CM

## 2022-02-05 PROCEDURE — 0054A SARSCOV2 VAC 30MCG TRS SUCR: CPT

## 2022-02-05 PROCEDURE — 77063 BREAST TOMOSYNTHESIS BI: CPT | Performed by: INTERNAL MEDICINE

## 2022-02-05 PROCEDURE — 77067 SCR MAMMO BI INCL CAD: CPT | Performed by: INTERNAL MEDICINE

## 2022-02-08 ENCOUNTER — PATIENT MESSAGE (OUTPATIENT)
Dept: INTERNAL MEDICINE CLINIC | Facility: CLINIC | Age: 47
End: 2022-02-08

## 2022-02-09 NOTE — TELEPHONE ENCOUNTER
The note from the 10/21 labs indicates that you wanted the pt to retest her A1c. Pt has been taking the OTC vitamin D but I was wondering if you wanted to retest the Vitamin D  Also to rule out a vitamin deficiency cause for the pt's hair loss. Pt is willing to get the labs and then f/u with an appt but states that she can only come in the evening or Saturdays. Could I add her on a Monday evening in the next couple of weeks?

## 2022-02-13 ENCOUNTER — LAB ENCOUNTER (OUTPATIENT)
Dept: LAB | Facility: HOSPITAL | Age: 47
End: 2022-02-13
Attending: INTERNAL MEDICINE
Payer: COMMERCIAL

## 2022-02-13 DIAGNOSIS — E55.9 VITAMIN D DEFICIENCY: ICD-10-CM

## 2022-02-13 DIAGNOSIS — R73.03 PREDIABETES: ICD-10-CM

## 2022-02-13 LAB
EST. AVERAGE GLUCOSE BLD GHB EST-MCNC: 128 MG/DL (ref 68–126)
HBA1C MFR BLD: 6.1 % (ref ?–5.7)
VIT D+METAB SERPL-MCNC: 35.5 NG/ML (ref 30–100)

## 2022-02-13 PROCEDURE — 82306 VITAMIN D 25 HYDROXY: CPT

## 2022-02-13 PROCEDURE — 36415 COLL VENOUS BLD VENIPUNCTURE: CPT

## 2022-02-13 PROCEDURE — 83036 HEMOGLOBIN GLYCOSYLATED A1C: CPT

## 2022-02-19 ENCOUNTER — LAB ENCOUNTER (OUTPATIENT)
Dept: LAB | Age: 47
End: 2022-02-19
Attending: INTERNAL MEDICINE
Payer: COMMERCIAL

## 2022-02-19 ENCOUNTER — OFFICE VISIT (OUTPATIENT)
Dept: INTERNAL MEDICINE CLINIC | Facility: CLINIC | Age: 47
End: 2022-02-19
Payer: COMMERCIAL

## 2022-02-19 VITALS
SYSTOLIC BLOOD PRESSURE: 119 MMHG | HEIGHT: 61 IN | HEART RATE: 91 BPM | WEIGHT: 219.13 LBS | RESPIRATION RATE: 18 BRPM | DIASTOLIC BLOOD PRESSURE: 77 MMHG | BODY MASS INDEX: 41.37 KG/M2

## 2022-02-19 DIAGNOSIS — Z12.11 COLON CANCER SCREENING: ICD-10-CM

## 2022-02-19 DIAGNOSIS — N92.0 MENORRHAGIA WITH REGULAR CYCLE: ICD-10-CM

## 2022-02-19 DIAGNOSIS — L21.9 SEBORRHEIC DERMATITIS: ICD-10-CM

## 2022-02-19 DIAGNOSIS — D64.9 ANEMIA, UNSPECIFIED TYPE: ICD-10-CM

## 2022-02-19 DIAGNOSIS — L65.9 HAIR LOSS: Primary | ICD-10-CM

## 2022-02-19 LAB
DEPRECATED HBV CORE AB SER IA-ACNC: 41.9 NG/ML
FOLATE SERPL-MCNC: 8.8 NG/ML (ref 8.7–?)
IRON SATN MFR SERPL: 12 %
IRON SERPL-MCNC: 49 UG/DL
TIBC SERPL-MCNC: 404 UG/DL (ref 240–450)
TRANSFERRIN SERPL-MCNC: 271 MG/DL (ref 200–360)
VIT B12 SERPL-MCNC: 1007 PG/ML (ref 193–986)

## 2022-02-19 PROCEDURE — 3078F DIAST BP <80 MM HG: CPT | Performed by: INTERNAL MEDICINE

## 2022-02-19 PROCEDURE — 36415 COLL VENOUS BLD VENIPUNCTURE: CPT

## 2022-02-19 PROCEDURE — 99214 OFFICE O/P EST MOD 30 MIN: CPT | Performed by: INTERNAL MEDICINE

## 2022-02-19 PROCEDURE — 90686 IIV4 VACC NO PRSV 0.5 ML IM: CPT | Performed by: INTERNAL MEDICINE

## 2022-02-19 PROCEDURE — 82746 ASSAY OF FOLIC ACID SERUM: CPT

## 2022-02-19 PROCEDURE — 82607 VITAMIN B-12: CPT

## 2022-02-19 PROCEDURE — 82728 ASSAY OF FERRITIN: CPT

## 2022-02-19 PROCEDURE — 90471 IMMUNIZATION ADMIN: CPT | Performed by: INTERNAL MEDICINE

## 2022-02-19 PROCEDURE — 83540 ASSAY OF IRON: CPT

## 2022-02-19 PROCEDURE — 3008F BODY MASS INDEX DOCD: CPT | Performed by: INTERNAL MEDICINE

## 2022-02-19 PROCEDURE — 84466 ASSAY OF TRANSFERRIN: CPT

## 2022-02-19 PROCEDURE — 3074F SYST BP LT 130 MM HG: CPT | Performed by: INTERNAL MEDICINE

## 2022-02-19 RX ORDER — KETOCONAZOLE 20 MG/ML
SHAMPOO TOPICAL
Qty: 120 ML | Refills: 3 | Status: SHIPPED | OUTPATIENT
Start: 2022-02-19

## 2022-02-19 NOTE — ASSESSMENT & PLAN NOTE
Patient has a mild chronic anemia. Work-up was negative for thalassemia trait or other hereditary anemia. Patient's anemia is most likely due to her heavy periods, however she is due for screening colonoscopy and we discussed the rationale for this. I will check her CBC and iron levels today.

## 2022-02-19 NOTE — ASSESSMENT & PLAN NOTE
Patient has been losing hair recently. She has a history of chronic severe seborrhea, which she does not treat on a regular basis. I will prescribe ketoconazole shampoo and I urged her to use this. Patient would also like to see the dermatologist.  Check ferritin level.

## 2022-08-06 DIAGNOSIS — J45.30 MILD PERSISTENT ASTHMA WITHOUT COMPLICATION: ICD-10-CM

## 2022-08-07 RX ORDER — MONTELUKAST SODIUM 10 MG/1
10 TABLET ORAL DAILY
Qty: 90 TABLET | Refills: 0 | Status: SHIPPED | OUTPATIENT
Start: 2022-08-07

## 2022-09-13 ENCOUNTER — TELEPHONE (OUTPATIENT)
Dept: INTERNAL MEDICINE CLINIC | Facility: CLINIC | Age: 47
End: 2022-09-13

## 2022-10-03 ENCOUNTER — PATIENT MESSAGE (OUTPATIENT)
Dept: INTERNAL MEDICINE CLINIC | Facility: CLINIC | Age: 47
End: 2022-10-03

## 2022-10-04 ENCOUNTER — OFFICE VISIT (OUTPATIENT)
Facility: CLINIC | Age: 47
End: 2022-10-04
Payer: COMMERCIAL

## 2022-10-04 ENCOUNTER — HOSPITAL ENCOUNTER (OUTPATIENT)
Dept: GENERAL RADIOLOGY | Age: 47
Discharge: HOME | End: 2022-10-04
Attending: INTERNAL MEDICINE
Payer: COMMERCIAL

## 2022-10-04 VITALS
HEIGHT: 61 IN | RESPIRATION RATE: 20 BRPM | HEART RATE: 95 BPM | DIASTOLIC BLOOD PRESSURE: 70 MMHG | SYSTOLIC BLOOD PRESSURE: 112 MMHG | WEIGHT: 231 LBS | OXYGEN SATURATION: 98 % | BODY MASS INDEX: 43.61 KG/M2

## 2022-10-04 DIAGNOSIS — R73.03 PREDIABETES: ICD-10-CM

## 2022-10-04 DIAGNOSIS — J45.20 MILD INTERMITTENT ASTHMA WITHOUT COMPLICATION: ICD-10-CM

## 2022-10-04 DIAGNOSIS — J45.30 MILD PERSISTENT ASTHMA WITHOUT COMPLICATION: ICD-10-CM

## 2022-10-04 DIAGNOSIS — K21.9 GASTROESOPHAGEAL REFLUX DISEASE, UNSPECIFIED WHETHER ESOPHAGITIS PRESENT: ICD-10-CM

## 2022-10-04 DIAGNOSIS — Z12.11 COLON CANCER SCREENING: ICD-10-CM

## 2022-10-04 DIAGNOSIS — R05.3 CHRONIC COUGH: Primary | ICD-10-CM

## 2022-10-04 PROCEDURE — 3074F SYST BP LT 130 MM HG: CPT | Performed by: INTERNAL MEDICINE

## 2022-10-04 PROCEDURE — 90677 PCV20 VACCINE IM: CPT | Performed by: INTERNAL MEDICINE

## 2022-10-04 PROCEDURE — 90472 IMMUNIZATION ADMIN EACH ADD: CPT | Performed by: INTERNAL MEDICINE

## 2022-10-04 PROCEDURE — 3078F DIAST BP <80 MM HG: CPT | Performed by: INTERNAL MEDICINE

## 2022-10-04 PROCEDURE — 3008F BODY MASS INDEX DOCD: CPT | Performed by: INTERNAL MEDICINE

## 2022-10-04 PROCEDURE — 90471 IMMUNIZATION ADMIN: CPT | Performed by: INTERNAL MEDICINE

## 2022-10-04 PROCEDURE — 99214 OFFICE O/P EST MOD 30 MIN: CPT | Performed by: INTERNAL MEDICINE

## 2022-10-04 PROCEDURE — 90686 IIV4 VACC NO PRSV 0.5 ML IM: CPT | Performed by: INTERNAL MEDICINE

## 2022-10-04 PROCEDURE — 71046 X-RAY EXAM CHEST 2 VIEWS: CPT | Performed by: INTERNAL MEDICINE

## 2022-10-04 RX ORDER — ALBUTEROL SULFATE 90 UG/1
2 AEROSOL, METERED RESPIRATORY (INHALATION) EVERY 6 HOURS PRN
Qty: 8 G | Refills: 3 | Status: SHIPPED | OUTPATIENT
Start: 2022-10-04

## 2022-10-04 RX ORDER — OMEPRAZOLE 40 MG/1
CAPSULE, DELAYED RELEASE ORAL
Qty: 60 CAPSULE | Refills: 0 | Status: SHIPPED | OUTPATIENT
Start: 2022-10-04 | End: 2022-11-04

## 2022-10-04 RX ORDER — MONTELUKAST SODIUM 10 MG/1
10 TABLET ORAL DAILY
Qty: 90 TABLET | Refills: 0 | Status: SHIPPED | OUTPATIENT
Start: 2022-10-04

## 2022-10-04 NOTE — ASSESSMENT & PLAN NOTE
Patient's cough has been worse and persistent. She is coughing during the visit. We will treat for both asthma and GERD and follow-up in a few weeks.

## 2022-10-04 NOTE — ASSESSMENT & PLAN NOTE
Patient thinks that the cough is due to GERD, however it has an asthmatic sounding component and is mildly improved with the albuterol. We will give her a trial of higher dose omeprazole for a couple of weeks.   She will also see the gastroenterologist.

## 2022-10-10 ENCOUNTER — LAB ENCOUNTER (OUTPATIENT)
Dept: LAB | Age: 47
End: 2022-10-10
Attending: INTERNAL MEDICINE
Payer: COMMERCIAL

## 2022-10-10 DIAGNOSIS — J45.30 MILD PERSISTENT ASTHMA WITHOUT COMPLICATION: ICD-10-CM

## 2022-10-11 LAB — SARS-COV-2 RNA RESP QL NAA+PROBE: NOT DETECTED

## 2022-10-13 ENCOUNTER — HOSPITAL ENCOUNTER (OUTPATIENT)
Dept: RESPIRATORY THERAPY | Facility: HOSPITAL | Age: 47
Discharge: HOME | End: 2022-10-13
Attending: INTERNAL MEDICINE
Payer: COMMERCIAL

## 2022-10-13 DIAGNOSIS — J45.30 MILD PERSISTENT ASTHMA WITHOUT COMPLICATION: ICD-10-CM

## 2022-10-13 PROCEDURE — 94726 PLETHYSMOGRAPHY LUNG VOLUMES: CPT

## 2022-10-13 PROCEDURE — 94729 DIFFUSING CAPACITY: CPT

## 2022-10-13 PROCEDURE — 94060 EVALUATION OF WHEEZING: CPT

## 2022-10-18 NOTE — PROCEDURES
[address] [facility] 1975 MRN [id number]   Height  58 inh  Age 49year old   Weight  231 lbs  Sex Female         Spirometry:   FEV1 2.44 L which is 93%  FEV1/FVC ratio 83% which is normal    No significant bronchodilator response      FVL:   Normal      Lung Volume:   Within normal limits  TLC 4.29 L is 91%  VC 2.94 L which is 96%  RV/TLC ratio is normal      DLCO:   80%      Impression:   Normal study        Thank you for allowing me to participate in the care of your patient. lEen Kapadia.  William Nicholson MD  10/17/2022  9:30 PM

## 2022-10-18 NOTE — ADDENDUM NOTE
Encounter addended by: Slade Salas MD on: 10/17/2022 9:31 PM   Actions taken: Clinical Note Signed, Charge Capture section accepted

## 2022-12-31 ENCOUNTER — TELEPHONE (OUTPATIENT)
Dept: INTERNAL MEDICINE CLINIC | Facility: CLINIC | Age: 47
End: 2022-12-31

## 2023-01-01 NOTE — ASSESSMENT & PLAN NOTE
I recommend that patient start inhaled steroids. Explained to patient that it takes several weeks for her to start work. Continue montelukast.  Patient will call if she feels she needs oral steroids again. Follow-up in 3 months.
Patient takes over-the-counter Allegra. Continue present management.
Immunizations:    hepatitis B IntraMuscular Vaccine - Peds: ( @ 13:19)      Synagis:       Screenings:    Latest CCHD screen:      Latest car seat screen:      Latest hearing screen:  Right ear hearing screen completed date: 2023  Right ear screen method: EOAE (evoked otoacoustic emission)  Right ear screen result: Passed  Right ear screen comment: N/A    Left ear hearing screen completed date: 2023  Left ear screen method: EOAE (evoked otoacoustic emission)  Left ear screen result: Passed  Left ear screen comments: N/A       screen:  Screen#: [id number]  Screen Date: 2023  Screen Comment: N/A    Screen#: [id number]  Screen Date: 2023  Screen Comment: N/A

## 2023-10-19 ENCOUNTER — OFFICE VISIT (OUTPATIENT)
Dept: INTERNAL MEDICINE CLINIC | Facility: CLINIC | Age: 48
End: 2023-10-19
Payer: COMMERCIAL

## 2023-10-19 VITALS
SYSTOLIC BLOOD PRESSURE: 116 MMHG | HEART RATE: 110 BPM | OXYGEN SATURATION: 98 % | RESPIRATION RATE: 18 BRPM | WEIGHT: 240 LBS | BODY MASS INDEX: 45.31 KG/M2 | HEIGHT: 61 IN | DIASTOLIC BLOOD PRESSURE: 80 MMHG

## 2023-10-19 DIAGNOSIS — Z00.00 ROUTINE HEALTH MAINTENANCE: Primary | ICD-10-CM

## 2023-10-19 DIAGNOSIS — E66.01 MORBID OBESITY DUE TO EXCESS CALORIES (HCC): ICD-10-CM

## 2023-10-19 DIAGNOSIS — J45.20 MILD INTERMITTENT ASTHMA WITHOUT COMPLICATION: ICD-10-CM

## 2023-10-19 DIAGNOSIS — R73.03 PREDIABETES: ICD-10-CM

## 2023-10-19 PROBLEM — R53.83 FATIGUE: Status: RESOLVED | Noted: 2019-11-07 | Resolved: 2023-10-19

## 2023-10-19 PROBLEM — N92.6 IRREGULAR MENSES: Status: RESOLVED | Noted: 2020-06-15 | Resolved: 2023-10-19

## 2023-10-19 PROBLEM — R92.8 ABNORMAL MAMMOGRAM: Status: RESOLVED | Noted: 2020-06-15 | Resolved: 2023-10-19

## 2023-10-19 PROBLEM — F43.21 GRIEF: Status: RESOLVED | Noted: 2020-06-15 | Resolved: 2023-10-19

## 2023-10-19 PROBLEM — Z30.41 ORAL CONTRACEPTIVE USE: Status: RESOLVED | Noted: 2018-03-12 | Resolved: 2023-10-19

## 2023-10-19 LAB
CARTRIDGE EXPIRATION DATE: ABNORMAL DATE
CARTRIDGE LOT#: 612 NUMERIC
HEMOGLOBIN A1C: 6.1 % (ref 4.3–5.6)

## 2023-10-19 PROCEDURE — 90471 IMMUNIZATION ADMIN: CPT | Performed by: INTERNAL MEDICINE

## 2023-10-19 PROCEDURE — 83036 HEMOGLOBIN GLYCOSYLATED A1C: CPT | Performed by: INTERNAL MEDICINE

## 2023-10-19 PROCEDURE — 3079F DIAST BP 80-89 MM HG: CPT | Performed by: INTERNAL MEDICINE

## 2023-10-19 PROCEDURE — 90686 IIV4 VACC NO PRSV 0.5 ML IM: CPT | Performed by: INTERNAL MEDICINE

## 2023-10-19 PROCEDURE — 99213 OFFICE O/P EST LOW 20 MIN: CPT | Performed by: INTERNAL MEDICINE

## 2023-10-19 PROCEDURE — 3074F SYST BP LT 130 MM HG: CPT | Performed by: INTERNAL MEDICINE

## 2023-10-19 PROCEDURE — 3008F BODY MASS INDEX DOCD: CPT | Performed by: INTERNAL MEDICINE

## 2023-10-19 PROCEDURE — 99396 PREV VISIT EST AGE 40-64: CPT | Performed by: INTERNAL MEDICINE

## 2023-10-19 RX ORDER — ALBUTEROL SULFATE 90 UG/1
2 AEROSOL, METERED RESPIRATORY (INHALATION) EVERY 6 HOURS PRN
Qty: 8 G | Refills: 3 | Status: SHIPPED | OUTPATIENT
Start: 2023-10-19

## 2023-10-19 NOTE — PATIENT INSTRUCTIONS
My last day will  be January 11, 2024.   I recommend the following providers:    [facility] (Internal Medicine):  [phone]  Ravi Tesfaye

## 2023-11-15 DIAGNOSIS — J45.30 MILD PERSISTENT ASTHMA WITHOUT COMPLICATION: ICD-10-CM

## 2023-11-17 RX ORDER — MONTELUKAST SODIUM 10 MG/1
10 TABLET ORAL DAILY
Qty: 90 TABLET | Refills: 3 | Status: SHIPPED | OUTPATIENT
Start: 2023-11-17

## 2023-11-17 NOTE — TELEPHONE ENCOUNTER
Refill passed per [place] FRS, [facility] protocol. Please review. Last prescribed 10/2022. Thank you. Rose Donita Requested Prescriptions   Pending Prescriptions Disp Refills    montelukast 10 MG Oral Tab 90 tablet 0     Sig: Take 1 tablet (10 mg total) by mouth daily.        Asthma & COPD Medication Protocol Passed - 11/15/2023  4:59 PM        Passed - In person appointment or virtual visit in the past 6 mos or appointment in next 3 mos     Recent Outpatient Visits              4 weeks ago Routine health maintenance    [address] [facility], Reuben Hussein MD    Office Visit    1 year ago Chronic cough    Delfino Ortiz MD    Office Visit    1 year ago [facility], [facility] Reuben Ogden MD    Office Visit    2 years ago Mild persistent asthma without complication    [address], [address] [facility], Kamran Arboleda MD    Office Visit    2 years ago Cough    [address], [address] [facility], [facility] [place], [place]    Telemedicine          Future Appointments         Provider Department Appt Notes    In 1 month [facility] Outpatient Visits              4 weeks ago Routine health maintenance    [address], [facility], [place] Reuben Ogden MD    Office Visit    1 year ago Chronic cough    Delfino Ortiz MD    Office Visit    1 year ago Hair loss    [address] [facility], Jose Stewart MD    Office Visit    2 years ago Mild persistent asthma without complication    [address] [facility], Kamran Arboleda MD    Office Visit    2 years ago Cough    [facility], [address] [place] [address]    Telemedicine            Future Appointments         Provider Department Appt Notes    In 1 month [address]

## 2023-12-02 DIAGNOSIS — J45.20 MILD INTERMITTENT ASTHMA WITHOUT COMPLICATION: ICD-10-CM

## 2023-12-04 RX ORDER — ALBUTEROL SULFATE 90 UG/1
2 AEROSOL, METERED RESPIRATORY (INHALATION) EVERY 6 HOURS PRN
Qty: 8 G | Refills: 3 | Status: SHIPPED | OUTPATIENT
Start: 2023-12-04

## 2024-01-03 ENCOUNTER — HOSPITAL ENCOUNTER (OUTPATIENT)
Dept: MAMMOGRAPHY | Age: 49
Discharge: HOME | End: 2024-01-03
Attending: INTERNAL MEDICINE
Payer: COMMERCIAL

## 2024-01-03 DIAGNOSIS — Z12.31 ENCOUNTER FOR MAMMOGRAM TO ESTABLISH BASELINE MAMMOGRAM: ICD-10-CM

## 2024-01-03 PROCEDURE — 77067 SCR MAMMO BI INCL CAD: CPT | Performed by: INTERNAL MEDICINE

## 2024-01-03 PROCEDURE — 77063 BREAST TOMOSYNTHESIS BI: CPT | Performed by: INTERNAL MEDICINE

## 2024-02-24 ENCOUNTER — LAB ENCOUNTER (OUTPATIENT)
Dept: LAB | Facility: HOSPITAL | Age: 49
End: 2024-02-24
Attending: INTERNAL MEDICINE
Payer: COMMERCIAL

## 2024-02-24 DIAGNOSIS — Z00.00 ROUTINE HEALTH MAINTENANCE: ICD-10-CM

## 2024-02-24 DIAGNOSIS — R73.01 ABNORMAL FASTING GLUCOSE: ICD-10-CM

## 2024-02-24 LAB
ALBUMIN SERPL-MCNC: 4.3 G/DL (ref 3.2–4.8)
ALBUMIN/GLOB SERPL: 1.2 {RATIO} (ref 1–2)
ALP LIVER SERPL-CCNC: 65 U/L
ALT SERPL-CCNC: 42 U/L
ANION GAP SERPL CALC-SCNC: 8 MMOL/L (ref 0–18)
AST SERPL-CCNC: 53 U/L (ref ?–34)
BASOPHILS # BLD AUTO: 0.02 X10(3) UL (ref 0–0.2)
BASOPHILS NFR BLD AUTO: 0.2 %
BILIRUB SERPL-MCNC: 0.3 MG/DL (ref 0.3–1.2)
BUN BLD-MCNC: 8 MG/DL (ref 9–23)
BUN/CREAT SERPL: 10.3 (ref 10–20)
CALCIUM BLD-MCNC: 9.6 MG/DL (ref 8.7–10.4)
CHLORIDE SERPL-SCNC: 104 MMOL/L (ref 98–112)
CHOLEST SERPL-MCNC: 161 MG/DL (ref ?–200)
CO2 SERPL-SCNC: 27 MMOL/L (ref 21–32)
CREAT BLD-MCNC: 0.78 MG/DL
DEPRECATED RDW RBC AUTO: 39.6 FL (ref 35.1–46.3)
EGFRCR SERPLBLD CKD-EPI 2021: 93 ML/MIN/1.73M2 (ref 60–?)
EOSINOPHIL # BLD AUTO: 0.39 X10(3) UL (ref 0–0.7)
EOSINOPHIL NFR BLD AUTO: 3.2 %
ERYTHROCYTE [DISTWIDTH] IN BLOOD BY AUTOMATED COUNT: 14 % (ref 11–15)
FASTING PATIENT LIPID ANSWER: YES
FASTING STATUS PATIENT QL REPORTED: YES
GLOBULIN PLAS-MCNC: 3.6 G/DL (ref 2.8–4.4)
GLUCOSE BLD-MCNC: 107 MG/DL (ref 70–99)
HCT VFR BLD AUTO: 37 %
HDLC SERPL-MCNC: 41 MG/DL (ref 40–59)
HGB BLD-MCNC: 11.5 G/DL
IMM GRANULOCYTES # BLD AUTO: 0.08 X10(3) UL (ref 0–1)
IMM GRANULOCYTES NFR BLD: 0.6 %
LDLC SERPL CALC-MCNC: 86 MG/DL (ref ?–100)
LYMPHOCYTES # BLD AUTO: 3.1 X10(3) UL (ref 1–4)
LYMPHOCYTES NFR BLD AUTO: 25.1 %
MCH RBC QN AUTO: 24.3 PG (ref 26–34)
MCHC RBC AUTO-ENTMCNC: 31.1 G/DL (ref 31–37)
MCV RBC AUTO: 78.2 FL
MONOCYTES # BLD AUTO: 0.49 X10(3) UL (ref 0.1–1)
MONOCYTES NFR BLD AUTO: 4 %
NEUTROPHILS # BLD AUTO: 8.27 X10 (3) UL (ref 1.5–7.7)
NEUTROPHILS # BLD AUTO: 8.27 X10(3) UL (ref 1.5–7.7)
NEUTROPHILS NFR BLD AUTO: 66.9 %
NONHDLC SERPL-MCNC: 120 MG/DL (ref ?–130)
OSMOLALITY SERPL CALC.SUM OF ELEC: 287 MOSM/KG (ref 275–295)
PLATELET # BLD AUTO: 355 10(3)UL (ref 150–450)
POTASSIUM SERPL-SCNC: 4.1 MMOL/L (ref 3.5–5.1)
PROT SERPL-MCNC: 7.9 G/DL (ref 5.7–8.2)
RBC # BLD AUTO: 4.73 X10(6)UL
SODIUM SERPL-SCNC: 139 MMOL/L (ref 136–145)
TRIGL SERPL-MCNC: 198 MG/DL (ref 30–149)
TSI SER-ACNC: 2.15 MIU/ML (ref 0.55–4.78)
VLDLC SERPL CALC-MCNC: 32 MG/DL (ref 0–30)
WBC # BLD AUTO: 12.4 X10(3) UL (ref 4–11)

## 2024-02-24 PROCEDURE — 83036 HEMOGLOBIN GLYCOSYLATED A1C: CPT

## 2024-02-24 PROCEDURE — 80053 COMPREHEN METABOLIC PANEL: CPT

## 2024-02-24 PROCEDURE — 85025 COMPLETE CBC W/AUTO DIFF WBC: CPT

## 2024-02-24 PROCEDURE — 84443 ASSAY THYROID STIM HORMONE: CPT

## 2024-02-24 PROCEDURE — 36415 COLL VENOUS BLD VENIPUNCTURE: CPT

## 2024-02-24 PROCEDURE — 80061 LIPID PANEL: CPT

## 2024-02-26 DIAGNOSIS — R73.01 ABNORMAL FASTING GLUCOSE: Primary | ICD-10-CM

## 2024-02-26 LAB
EST. AVERAGE GLUCOSE BLD GHB EST-MCNC: 151 MG/DL (ref 68–126)
HBA1C MFR BLD: 6.9 % (ref ?–5.7)

## 2024-10-30 ENCOUNTER — OFFICE VISIT (OUTPATIENT)
Dept: INTERNAL MEDICINE CLINIC | Facility: CLINIC | Age: 49
End: 2024-10-30
Payer: COMMERCIAL

## 2024-10-30 VITALS
RESPIRATION RATE: 18 BRPM | BODY MASS INDEX: 43.97 KG/M2 | HEART RATE: 101 BPM | DIASTOLIC BLOOD PRESSURE: 82 MMHG | SYSTOLIC BLOOD PRESSURE: 118 MMHG | WEIGHT: 242 LBS | HEIGHT: 62.21 IN

## 2024-10-30 DIAGNOSIS — E11.9 TYPE 2 DIABETES MELLITUS WITHOUT COMPLICATION, WITHOUT LONG-TERM CURRENT USE OF INSULIN (HCC): ICD-10-CM

## 2024-10-30 DIAGNOSIS — E66.01 CLASS 3 SEVERE OBESITY DUE TO EXCESS CALORIES WITH SERIOUS COMORBIDITY AND BODY MASS INDEX (BMI) OF 40.0 TO 44.9 IN ADULT (HCC): ICD-10-CM

## 2024-10-30 DIAGNOSIS — L65.9 FALLING HAIR: ICD-10-CM

## 2024-10-30 DIAGNOSIS — E66.813 CLASS 3 SEVERE OBESITY DUE TO EXCESS CALORIES WITH SERIOUS COMORBIDITY AND BODY MASS INDEX (BMI) OF 40.0 TO 44.9 IN ADULT (HCC): ICD-10-CM

## 2024-10-30 DIAGNOSIS — Z12.11 SCREENING FOR COLON CANCER: ICD-10-CM

## 2024-10-30 DIAGNOSIS — Z00.00 ANNUAL PHYSICAL EXAM: Primary | ICD-10-CM

## 2024-10-30 DIAGNOSIS — Z12.31 ENCOUNTER FOR SCREENING MAMMOGRAM FOR MALIGNANT NEOPLASM OF BREAST: ICD-10-CM

## 2024-10-30 PROCEDURE — 99396 PREV VISIT EST AGE 40-64: CPT | Performed by: INTERNAL MEDICINE

## 2024-10-30 PROCEDURE — 90471 IMMUNIZATION ADMIN: CPT | Performed by: INTERNAL MEDICINE

## 2024-10-30 PROCEDURE — 90472 IMMUNIZATION ADMIN EACH ADD: CPT | Performed by: INTERNAL MEDICINE

## 2024-10-30 PROCEDURE — 3044F HG A1C LEVEL LT 7.0%: CPT | Performed by: INTERNAL MEDICINE

## 2024-10-30 PROCEDURE — 3074F SYST BP LT 130 MM HG: CPT | Performed by: INTERNAL MEDICINE

## 2024-10-30 PROCEDURE — 90750 HZV VACC RECOMBINANT IM: CPT | Performed by: INTERNAL MEDICINE

## 2024-10-30 PROCEDURE — 3008F BODY MASS INDEX DOCD: CPT | Performed by: INTERNAL MEDICINE

## 2024-10-30 PROCEDURE — 3079F DIAST BP 80-89 MM HG: CPT | Performed by: INTERNAL MEDICINE

## 2024-10-30 PROCEDURE — 90656 IIV3 VACC NO PRSV 0.5 ML IM: CPT | Performed by: INTERNAL MEDICINE

## 2024-10-30 NOTE — PROGRESS NOTES
Crista Ortiz is a 49 year old female.  Chief Complaint   Patient presents with    Physical     HPI:    Patient presented today for annual physical. She states that she has been having increased hair fall. It improved with some home remedies but now it has started again.     Her hemoglobin A1c was elevated in diabetic range back in feb but she missed the [employer] message and has not followed up yet.    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 8 g 3    montelukast 10 MG Oral Tab Take 1 tablet (10 mg total) by mouth daily. (Patient not taking: Reported on 10/30/2024) 90 tablet 3    ketoconazole (NIZORAL) 2 % External Shampoo Use 3 times weekly.  Leave on 5 min, then rinse. (Patient not taking: Reported on 10/30/2024) 120 mL 3      Past Medical History:    Anemia    Migraine      Past Surgical History:   Procedure Laterality Date    Appendectomy      Colonoscopy      D & c      Excision turbinate,submucous  12/21/2012    Procedure: STEREOTACTIC IMAGE GUIDANCE, SINUS ENDOSCOPY ANTROSTOMIES;  Surgeon: Corina Clayton MD;  Location: [facility]    Excision turbinate,submucous  12/21/2012    Procedure: STEREOTACTIC IMAGE GUIDANCE, SINUS ENDOSCOPY ANTROSTOMIES;  Surgeon: Corina Clayton MD;  Location: [facility]    Nasal scopy,explor frontal sinus  12/21/2012    Procedure: STEREOTACTIC IMAGE GUIDANCE, SINUS ENDOSCOPY ANTROSTOMIES;  Surgeon: Corina Clayton MD;  Location: [facility]    Nasal scopy,explor frontal sinus  12/21/2012    Procedure: STEREOTACTIC IMAGE GUIDANCE, SINUS ENDOSCOPY ANTROSTOMIES;  Surgeon: Corina Clayton MD;  Location: [facility]    Nasal  scopy,open maxill sinus  12/21/2012    Procedure: STEREOTACTIC IMAGE GUIDANCE, SINUS ENDOSCOPY ANTROSTOMIES;  Surgeon: Corina Clayton MD;  Location: [facility]    Nasal scopy,remv totl ethmoid  12/21/2012    Procedure: STEREOTACTIC IMAGE GUIDANCE, SINUS ENDOSCOPY ANTROSTOMIES;  Surgeon: Corina Clayton MD;  Location: [facility]    Nasal scopy,remv totl ethmoid  12/21/2012    Procedure: STEREOTACTIC IMAGE GUIDANCE, SINUS ENDOSCOPY ANTROSTOMIES;  Surgeon: Corina Clayton MD;  Location: [facility]    Nasal scopy,rmv tiss maxill sinus  12/21/2012    Procedure: STEREOTACTIC IMAGE GUIDANCE, SINUS ENDOSCOPY ANTROSTOMIES;  Surgeon: Corina Clayton MD;  Location: [facility]    Nasal scopy,sphenoidotomy  12/21/2012    Procedure: STEREOTACTIC IMAGE GUIDANCE, SINUS ENDOSCOPY ANTROSTOMIES;  Surgeon: Corina Clayton MD;  Location: [facility]    Nasal/sinus scopy,rmv jus bull  12/21/2012    Procedure: STEREOTACTIC IMAGE GUIDANCE, SINUS ENDOSCOPY ANTROSTOMIES;  Surgeon: Corina Claytno MD;  Location: [facility]    Repair of nasal septum  12/21/2012    Procedure: STEREOTACTIC IMAGE GUIDANCE, SINUS ENDOSCOPY ANTROSTOMIES;  Surgeon: Corina Clayton MD;  Location: [facility]    Scan proc cranial extra  12/21/2012    Procedure: STEREOTACTIC IMAGE GUIDANCE, SINUS ENDOSCOPY ANTROSTOMIES;  Surgeon: Corina Clayton MD;  Location: [facility]      Social History:  Social History     Socioeconomic History    Marital status:    Tobacco Use    Smoking status: Never    Smokeless tobacco: Never   Vaping Use    Vaping status: Never Used   Substance and Sexual Activity    Alcohol use: No    Drug use: No    Sexual activity: Not Currently   Other Topics Concern    Reaction to local anesthetic No   Social History 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   HENT: Negative for congestion and voice change.    Respiratory: Negative for cough and shortness of breath.    Cardiovascular: Negative for chest pain.   Gastrointestinal: Negative for abdominal distention, abdominal pain and vomiting.   Genitourinary: Negative for hematuria.   Skin: Negative for wound.   Psychiatric/Behavioral: Negative for behavioral problems.   Wt Readings from Last 5 Encounters:   10/30/24 242 lb (109.8 kg)   10/19/23 240 lb (108.9 kg)   10/04/22 231 lb (104.8 kg)   02/19/22 219 lb 1.6 oz (99.4 kg)   06/08/21 224 lb 14.4 oz (102 kg)     Body mass index is 43.97 kg/m².      EXAM:   /82   Pulse 101   Resp 18   Ht 5' 2.21\" (1.58 m)   Wt 242 lb (109.8 kg)   LMP 10/24/2024 (Approximate)   BMI 43.97 kg/m²   Physical Exam   Constitutional:       Appearance: Normal appearance.   HENT:      Head: Normocephalic.   Eyes:      Conjunctiva/sclera: Conjunctivae normal.   Cardiovascular:      Rate and Rhythm: Normal rate and regular rhythm.      Heart sounds: Normal heart sounds. No murmur heard.  Pulmonary:      Effort: Pulmonary effort is normal.      Breath sounds: Normal breath sounds. No rhonchi or rales.   Abdominal:      General: Bowel sounds are normal.      Palpations: Abdomen is soft.      Tenderness: There is no abdominal tenderness.   Musculoskeletal:      Cervical back: Neck supple.      Right lower leg: No edema.      Left lower leg: No edema.   Skin:     General: Skin is warm and dry.   Neurological:      General: No  focal deficit present.      Mental Status: He is alert and oriented to person, place, and time. Mental status is at baseline.   Psychiatric:         Mood and Affect: Mood normal.         Behavior: Behavior normal.       ASSESSMENT AND PLAN:   1. Annual physical exam  - check annual fasting labs  - immunizations reviewed. Flu and shingles shot given today.   - diet and exercise counseling  - Comp Metabolic Panel (14); Future  - Lipid Panel; Future  - TSH W Reflex To Free T4; Future  - Vitamin D; Future  - CBC With Differential With Platelet; Future    2. Encounter for screening mammogram for malignant neoplasm of breast  - [facility] MOUNA 2D+3D SCREENING BILAT (CPT=77067/51986); Future    3. Type 2 diabetes mellitus without complication, without long-term current use of insulin (HCC)  4- obesity. BMI 43.9  - detailed discussion regarding diabetes diagnosis  - dietary and exercise modifications discussed  - start ozempic  - check labs  - start checking blood glucose daily in am and keep a log  - metformin also discussed but wants to try one medication at a time  - Hemoglobin A1C [E]; Future  - Microalb/Creat Ratio, Random Urine; Future    4. Screening for colon cancer  - GASTRO - INTERNAL    5. Falling hair  - check vitamin D  - start biotin supplement  - Iron And Tibc [E]; Future  - Ferritin [E]; Future      The patient indicates understanding of these issues and agrees to the plan.  Follow up in 3 months  Cordelia Fox MD        [1] No Active Allergies

## 2024-10-31 ENCOUNTER — TELEPHONE (OUTPATIENT)
Dept: INTERNAL MEDICINE CLINIC | Facility: CLINIC | Age: 49
End: 2024-10-31

## 2024-10-31 DIAGNOSIS — E11.9 TYPE 2 DIABETES MELLITUS WITHOUT COMPLICATION, WITHOUT LONG-TERM CURRENT USE OF INSULIN (HCC): Primary | ICD-10-CM

## 2024-10-31 RX ORDER — BLOOD-GLUCOSE CONTROL, NORMAL
EACH MISCELLANEOUS
Qty: 1 EACH | Refills: 5 | Status: SHIPPED | OUTPATIENT
Start: 2024-10-31

## 2024-10-31 RX ORDER — DIPHENHYDRAMINE HYDROCHLORIDE 25 MG/1
CAPSULE, LIQUID FILLED ORAL
Qty: 1 KIT | Refills: 0 | Status: SHIPPED | OUTPATIENT
Start: 2024-10-31

## 2024-10-31 RX ORDER — LANCETS 30 GAUGE
EACH MISCELLANEOUS
Qty: 100 EACH | Refills: 5 | Status: SHIPPED | OUTPATIENT
Start: 2024-10-31

## 2024-10-31 RX ORDER — BLOOD SUGAR DIAGNOSTIC
1 STRIP MISCELLANEOUS DAILY
Qty: 100 STRIP | Refills: 5 | Status: SHIPPED | OUTPATIENT
Start: 2024-10-31

## 2024-10-31 NOTE — TELEPHONE ENCOUNTER
Diabetic supplies cannot be entered into [employer] as DME.  Script pended for pharmacy, please review and advise if appropriate.

## 2024-11-02 ENCOUNTER — LAB ENCOUNTER (OUTPATIENT)
Dept: LAB | Age: 49
End: 2024-11-02
Attending: INTERNAL MEDICINE
Payer: COMMERCIAL

## 2024-11-02 DIAGNOSIS — L65.9 FALLING HAIR: ICD-10-CM

## 2024-11-02 DIAGNOSIS — E11.9 TYPE 2 DIABETES MELLITUS WITHOUT COMPLICATION, WITHOUT LONG-TERM CURRENT USE OF INSULIN (HCC): ICD-10-CM

## 2024-11-02 DIAGNOSIS — Z00.00 ANNUAL PHYSICAL EXAM: ICD-10-CM

## 2024-11-02 LAB
ALBUMIN SERPL-MCNC: 4.4 G/DL (ref 3.2–4.8)
ALBUMIN/GLOB SERPL: 1.3 {RATIO} (ref 1–2)
ALP LIVER SERPL-CCNC: 64 U/L
ALT SERPL-CCNC: 40 U/L
ANION GAP SERPL CALC-SCNC: 8 MMOL/L (ref 0–18)
AST SERPL-CCNC: 57 U/L (ref ?–34)
BASOPHILS # BLD AUTO: 0.03 X10(3) UL (ref 0–0.2)
BASOPHILS NFR BLD AUTO: 0.3 %
BILIRUB SERPL-MCNC: 0.3 MG/DL (ref 0.3–1.2)
BUN BLD-MCNC: 8 MG/DL (ref 9–23)
BUN/CREAT SERPL: 10.1 (ref 10–20)
CALCIUM BLD-MCNC: 10 MG/DL (ref 8.7–10.4)
CHLORIDE SERPL-SCNC: 104 MMOL/L (ref 98–112)
CHOLEST SERPL-MCNC: 171 MG/DL (ref ?–200)
CO2 SERPL-SCNC: 26 MMOL/L (ref 21–32)
CREAT BLD-MCNC: 0.79 MG/DL
CREAT UR-SCNC: 232.8 MG/DL
DEPRECATED HBV CORE AB SER IA-ACNC: 40 NG/ML
DEPRECATED RDW RBC AUTO: 41.1 FL (ref 35.1–46.3)
EGFRCR SERPLBLD CKD-EPI 2021: 91 ML/MIN/1.73M2 (ref 60–?)
EOSINOPHIL # BLD AUTO: 0.44 X10(3) UL (ref 0–0.7)
EOSINOPHIL NFR BLD AUTO: 3.9 %
ERYTHROCYTE [DISTWIDTH] IN BLOOD BY AUTOMATED COUNT: 14.7 % (ref 11–15)
EST. AVERAGE GLUCOSE BLD GHB EST-MCNC: 163 MG/DL (ref 68–126)
FASTING PATIENT LIPID ANSWER: YES
FASTING STATUS PATIENT QL REPORTED: YES
GLOBULIN PLAS-MCNC: 3.4 G/DL (ref 2–3.5)
GLUCOSE BLD-MCNC: 135 MG/DL (ref 70–99)
HBA1C MFR BLD: 7.3 % (ref ?–5.7)
HCT VFR BLD AUTO: 38.6 %
HDLC SERPL-MCNC: 48 MG/DL (ref 40–59)
HGB BLD-MCNC: 11.6 G/DL
IMM GRANULOCYTES # BLD AUTO: 0.08 X10(3) UL (ref 0–1)
IMM GRANULOCYTES NFR BLD: 0.7 %
IRON SATN MFR SERPL: 10 %
IRON SERPL-MCNC: 46 UG/DL
LDLC SERPL CALC-MCNC: 89 MG/DL (ref ?–100)
LYMPHOCYTES # BLD AUTO: 2.52 X10(3) UL (ref 1–4)
LYMPHOCYTES NFR BLD AUTO: 22.1 %
MCH RBC QN AUTO: 23.2 PG (ref 26–34)
MCHC RBC AUTO-ENTMCNC: 30.1 G/DL (ref 31–37)
MCV RBC AUTO: 77.4 FL
MICROALBUMIN UR-MCNC: 60.5 MG/DL
MICROALBUMIN/CREAT 24H UR-RTO: 259.9 UG/MG (ref ?–30)
MONOCYTES # BLD AUTO: 0.53 X10(3) UL (ref 0.1–1)
MONOCYTES NFR BLD AUTO: 4.7 %
NEUTROPHILS # BLD AUTO: 7.79 X10 (3) UL (ref 1.5–7.7)
NEUTROPHILS # BLD AUTO: 7.79 X10(3) UL (ref 1.5–7.7)
NEUTROPHILS NFR BLD AUTO: 68.3 %
NONHDLC SERPL-MCNC: 123 MG/DL (ref ?–130)
OSMOLALITY SERPL CALC.SUM OF ELEC: 286 MOSM/KG (ref 275–295)
PLATELET # BLD AUTO: 376 10(3)UL (ref 150–450)
POTASSIUM SERPL-SCNC: 4.4 MMOL/L (ref 3.5–5.1)
PROT SERPL-MCNC: 7.8 G/DL (ref 5.7–8.2)
RBC # BLD AUTO: 4.99 X10(6)UL
SODIUM SERPL-SCNC: 138 MMOL/L (ref 136–145)
TIBC SERPL-MCNC: 443 UG/DL (ref 250–425)
TRANSFERRIN SERPL-MCNC: 297 MG/DL (ref 250–380)
TRIGL SERPL-MCNC: 198 MG/DL (ref 30–149)
TSI SER-ACNC: 2.57 UIU/ML (ref 0.55–4.78)
VIT D+METAB SERPL-MCNC: 23.9 NG/ML (ref 30–100)
VLDLC SERPL CALC-MCNC: 32 MG/DL (ref 0–30)
WBC # BLD AUTO: 11.4 X10(3) UL (ref 4–11)

## 2024-11-02 PROCEDURE — 83036 HEMOGLOBIN GLYCOSYLATED A1C: CPT

## 2024-11-02 PROCEDURE — 82043 UR ALBUMIN QUANTITATIVE: CPT

## 2024-11-02 PROCEDURE — 80053 COMPREHEN METABOLIC PANEL: CPT

## 2024-11-02 PROCEDURE — 80061 LIPID PANEL: CPT

## 2024-11-02 PROCEDURE — 84443 ASSAY THYROID STIM HORMONE: CPT

## 2024-11-02 PROCEDURE — 82728 ASSAY OF FERRITIN: CPT

## 2024-11-02 PROCEDURE — 36415 COLL VENOUS BLD VENIPUNCTURE: CPT

## 2024-11-02 PROCEDURE — 83540 ASSAY OF IRON: CPT

## 2024-11-02 PROCEDURE — 85025 COMPLETE CBC W/AUTO DIFF WBC: CPT

## 2024-11-02 PROCEDURE — 82306 VITAMIN D 25 HYDROXY: CPT

## 2024-11-02 PROCEDURE — 82570 ASSAY OF URINE CREATININE: CPT

## 2024-11-02 PROCEDURE — 84466 ASSAY OF TRANSFERRIN: CPT

## 2024-11-05 ENCOUNTER — TELEPHONE (OUTPATIENT)
Dept: INTERNAL MEDICINE CLINIC | Facility: CLINIC | Age: 49
End: 2024-11-05

## 2024-11-05 RX ORDER — SEMAGLUTIDE 0.68 MG/ML
0.25 INJECTION, SOLUTION SUBCUTANEOUS WEEKLY
Qty: 1 EACH | Refills: 1 | Status: SHIPPED | OUTPATIENT
Start: 2024-11-05 | End: 2024-11-11

## 2024-11-05 RX ORDER — FERROUS SULFATE 325(65) MG
325 TABLET ORAL
Qty: 90 TABLET | Refills: 1 | Status: SHIPPED | OUTPATIENT
Start: 2024-11-05

## 2024-11-05 NOTE — TELEPHONE ENCOUNTER
Pt called back  Spoke with patient (identified name and ), results reviewed and agrees with plan.  Appt applied for 24  Will call back if need PA for Ozempic

## 2024-11-05 NOTE — TELEPHONE ENCOUNTER
Please notify patient. She has missed Mychart messages prviousely,     Your labs were reviewed.   [id number] is elevated to 7.3. Please start ozempic 0.25 mg once a week as we discussed. Prescription sent.   Start checking your blood glucose once a day before breakfast and keep a log.   Iron is low. Please start taking ferrous sulfate, once a day.   Vitamin D is low. Please start taking vitamin D 1000 international units  orally daily  Triglycerides are high. This can improve with low carb diet. Increase acitvity.  Follow up in office in 1 month with your blood glucose log.   Thank you   Cordelia Fox M.D.

## 2024-11-08 ENCOUNTER — TELEPHONE (OUTPATIENT)
Dept: INTERNAL MEDICINE CLINIC | Facility: CLINIC | Age: 49
End: 2024-11-08

## 2024-11-08 NOTE — TELEPHONE ENCOUNTER
Disp Refills Start End    semaglutide (OZEMPIC, 0.25 OR 0.5 MG/DOSE,) 2 MG/3ML Subcutaneous Solution Pen-injector 1 each 1 11/5/2024 --    Sig - Route: Inject 0.25 mg into the skin once a week. - Subcutaneous    Sent to pharmacy as: Ozempic (0.25 or 0.5 MG/DOSE) 2 MG/3ML Subcutaneous Solution Pen-injector (semaglutide)    E-Prescribing Status: Receipt confirmed by pharmacy (11/5/2024 11:29 AM CST)      Pharmacy    [id number] - [place], [place] - [address] AT [facility], [phone], [phone]

## 2024-11-08 NOTE — TELEPHONE ENCOUNTER
semaglutide (OZEMPIC, 0.25 OR 0.5 MG/DOSE,) 2 MG/3ML Subcutaneous Solution Pen-injector, Inject 0.25 mg into the skin once a week., Disp: 1 each, Rfl: 1

## 2024-11-09 ENCOUNTER — TELEPHONE (OUTPATIENT)
Dept: INTERNAL MEDICINE CLINIC | Facility: CLINIC | Age: 49
End: 2024-11-09

## 2024-11-09 NOTE — TELEPHONE ENCOUNTER
Prior authorization request for:      semaglutide (OZEMPIC, 0.25 OR 0.5 MG/DOSE,) 2 MG/3ML Subcutaneous Solution Pen-injector, Inject 0.25 mg into the skin once a week., Disp: 1 each, Rfl: 1    [email or web address]    Jessika: [email or web address]  Patient's last name: Diana  Patient's YOB: 1975

## 2024-11-11 RX ORDER — SEMAGLUTIDE 0.68 MG/ML
0.25 INJECTION, SOLUTION SUBCUTANEOUS WEEKLY
Qty: 3 ML | Refills: 1 | Status: SHIPPED | OUTPATIENT
Start: 2024-11-11

## 2024-11-15 ENCOUNTER — NURSE ONLY (OUTPATIENT)
Facility: CLINIC | Age: 49
End: 2024-11-15

## 2024-11-15 NOTE — PROGRESS NOTES
Patient did not meet the direct scheduling from the telephone colon screening due to abnormal lab results (anemia). Provided patient with office visit location, date, and time. Patient is aware to arrive early for check in process.

## 2024-11-15 NOTE — PROGRESS NOTES
8/23/2018  Arrived for CTA coronary arteries.  Creatinine (mg/dL)   Date Value   08/20/2018 0.78     GFR Estimate, Non  (no units)   Date Value   08/20/2018 >90     Did not take any pre beta blockers prior to scan.   Heart rate on arrival 57 - 59.    After completion of scanning instructed on increasing oral fluid intake post IV contrast. IV left in place for ECHO. Tolerated procedure well. Jessica Infante RN           Called patient for TCS/positive FIT.   Please advise on colonoscopy and bowel prep orders.   Medications, pharmacy, and allergies reviewed.     Age 45-76 y/o: Yes   › MD preference:   › Insurance:  [employer] [place]PO  › Last PCP visit: 10/30/24  › Last CBC: 11/2/24  › Date of positive FIT (if applicable): N/A  › H/W/BMI: 5'2.21/ 242.0 Lbs    Special comments/notes:  Recall    Last Procedure, Date, MD:      Last diagnosis:    Recalled for (mth/yrs):    Sedation used previously:    Last Prep Used:    Quality of Prep:      Telephone Colon Screening Questionnaire Yes No   Are you currently experiencing any new/abnormal GI symptoms? [] [x]   If yes, explain:     Rectal bleeding? [] [x]   Black stool? [] [x]   Dysphagia or food \"feeling stuck\" when eating? [] [x]   Intractable vomiting? [] [x]   Unexplained weight loss? [] [x]   First colonoscopy? [] [x]   Family history of colon cancer? [] [x]   Any issues with anesthesia? [] [x]   If yes, explain:      Any recent complaints related to chest pain &/or shortness of breath?  [] [x]   Were you referred to a cardiologist?  [] [x]   If yes, explain:      History of  respiratory issues/oxygen/ALEXIS/COPD: [] [x]   CPAP/BiPAP:     History of devices (pacemaker/defibrillator) [] [x]   History of heart attack &/or stroke?  [] [x]   If yes, in the last 12 months? Stent placement?  [] [x]     Medications Yes  No   Anticoagulants (except Aspirin):  [] [x]   Diabetic Medication- Ozempic [x] []   Weight loss meds (phentermine/vyvanse/saxsenda/etc): [] [x]   Iron/herbal/multivitamin supplement: Iron [x] []   Usage of marijuana, CBD &/or vape products: [] [x]

## 2024-12-11 ENCOUNTER — OFFICE VISIT (OUTPATIENT)
Dept: INTERNAL MEDICINE CLINIC | Facility: CLINIC | Age: 49
End: 2024-12-11
Payer: COMMERCIAL

## 2024-12-11 VITALS
HEIGHT: 62.21 IN | HEART RATE: 98 BPM | SYSTOLIC BLOOD PRESSURE: 132 MMHG | WEIGHT: 240 LBS | BODY MASS INDEX: 43.61 KG/M2 | DIASTOLIC BLOOD PRESSURE: 86 MMHG

## 2024-12-11 DIAGNOSIS — E11.9 TYPE 2 DIABETES MELLITUS WITHOUT COMPLICATION, WITHOUT LONG-TERM CURRENT USE OF INSULIN (HCC): Primary | ICD-10-CM

## 2024-12-11 DIAGNOSIS — E66.01 MORBID OBESITY DUE TO EXCESS CALORIES (HCC): ICD-10-CM

## 2024-12-11 PROCEDURE — 3079F DIAST BP 80-89 MM HG: CPT | Performed by: INTERNAL MEDICINE

## 2024-12-11 PROCEDURE — 3075F SYST BP GE 130 - 139MM HG: CPT | Performed by: INTERNAL MEDICINE

## 2024-12-11 PROCEDURE — 3051F HG A1C>EQUAL 7.0%<8.0%: CPT | Performed by: INTERNAL MEDICINE

## 2024-12-11 PROCEDURE — 3008F BODY MASS INDEX DOCD: CPT | Performed by: INTERNAL MEDICINE

## 2024-12-11 PROCEDURE — 99213 OFFICE O/P EST LOW 20 MIN: CPT | Performed by: INTERNAL MEDICINE

## 2024-12-11 PROCEDURE — 3060F POS MICROALBUMINURIA REV: CPT | Performed by: INTERNAL MEDICINE

## 2024-12-11 RX ORDER — LOSARTAN POTASSIUM 25 MG/1
25 TABLET ORAL DAILY
Qty: 90 TABLET | Refills: 3 | Status: SHIPPED | OUTPATIENT
Start: 2024-12-11

## 2024-12-11 RX ORDER — SEMAGLUTIDE 0.68 MG/ML
0.5 INJECTION, SOLUTION SUBCUTANEOUS WEEKLY
Qty: 3 ML | Refills: 3 | Status: SHIPPED | OUTPATIENT
Start: 2024-12-11

## 2024-12-12 NOTE — PROGRESS NOTES
Crista Ortiz is a 49 year old female.  Chief Complaint   Patient presents with    Follow - Up     HPI:    Patient presented today for follow up. She states that she has been doing well over all. Has taken four weeks of ozempic and will be increasing to 0.5 mg from next week. No nausea or vomiting. Appetite has decreased and has also lost some weight.    C/o pain in her left ankle sometimes specially with walking. No swelling or numbness.       Current Outpatient Medications   Medication Sig Dispense Refill    semaglutide (OZEMPIC, 0.25 OR 0.5 MG/DOSE,) 2 MG/3ML Subcutaneous Solution Pen-injector Inject 0.5 mg into the skin once a week. 3 mL 3    losartan 25 MG Oral Tab Take 1 tablet (25 mg total) by mouth daily. 90 tablet 3    Ferrous Sulfate 325 (65 Fe) MG Oral Tab Take 1 tablet (325 mg total) by mouth daily with breakfast. 90 tablet 1    Blood Glucose Monitoring Suppl (BLOOD GLUCOSE MONITOR SYSTEM) w/Device Does not apply Kit Test once daily 1 kit 0    Lancets Does not apply Misc Test once daily 100 each 5    Glucose Blood (BLOOD GLUCOSE TEST STRIPS 333) In Vitro Strip 1 each by In Vitro route daily. 100 strip 5    Blood Glucose Calibration (GLUCOSE CONTROL) In Vitro Solution Test once daily 1 each 5    albuterol (PROAIR HFA) 108 (90 Base) MCG/ACT Inhalation Aero Soln Inhale 2 puffs into the lungs every 6 (six) hours as needed for Wheezing. 8 g 3    montelukast 10 MG Oral Tab Take 1 tablet (10 mg total) by mouth daily. (Patient not taking: Reported on 12/11/2024) 90 tablet 3    ketoconazole (NIZORAL) 2 % External Shampoo Use 3 times weekly.  Leave on 5 min, then rinse. (Patient not taking: Reported on 12/11/2024) 120 mL 3      Past Medical History:    Anemia    Migraine      Past Surgical History:   Procedure Laterality Date    Appendectomy      Colonoscopy      D & c      Excision turbinate,submucous  12/21/2012    Procedure: STEREOTACTIC IMAGE GUIDANCE, SINUS ENDOSCOPY ANTROSTOMIES;  Surgeon: Kojo  Corina Mahmood MD;  Location: [facility]    Excision turbinate,submucous  12/21/2012    Procedure: STEREOTACTIC IMAGE GUIDANCE, SINUS ENDOSCOPY ANTROSTOMIES;  Surgeon: Corina Clayton MD;  Location: [facility]    Nasal scopy,explor frontal sinus  12/21/2012    Procedure: STEREOTACTIC IMAGE GUIDANCE, SINUS ENDOSCOPY ANTROSTOMIES;  Surgeon: Corina Clayton MD;  Location: [facility]    Nasal scopy,explor frontal sinus  12/21/2012    Procedure: STEREOTACTIC IMAGE GUIDANCE, SINUS ENDOSCOPY ANTROSTOMIES;  Surgeon: Corina Clayton MD;  Location: [facility]    Nasal scopy,open maxill sinus  12/21/2012    Procedure: STEREOTACTIC IMAGE GUIDANCE, SINUS ENDOSCOPY ANTROSTOMIES;  Surgeon: Corina Clayton MD;  Location: [facility]    Nasal scopy,remv totl ethmoid  12/21/2012    Procedure: STEREOTACTIC IMAGE GUIDANCE, SINUS ENDOSCOPY ANTROSTOMIES;  Surgeon: Corina Clayton MD;  Location: [facility]    Nasal scopy,remv totl ethmoid  12/21/2012    Procedure: STEREOTACTIC IMAGE GUIDANCE, SINUS ENDOSCOPY ANTROSTOMIES;  Surgeon: Corina Clayton MD;  Location: [facility]    Nasal scopy,rmv tiss maxill sinus  12/21/2012    Procedure: STEREOTACTIC IMAGE GUIDANCE, SINUS ENDOSCOPY ANTROSTOMIES;  Surgeon: Corina Clayton MD;  Location: [facility]    Nasal scopy,sphenoidotomy  12/21/2012    Procedure: STEREOTACTIC IMAGE GUIDANCE, SINUS ENDOSCOPY ANTROSTOMIES;  Surgeon: Corina Clayton MD;  Location: [facility]    Nasal/sinus scopy,rmv jus bull  12/21/2012    Procedure: STEREOTACTIC IMAGE GUIDANCE, SINUS ENDOSCOPY ANTROSTOMIES;  Surgeon: Corina Clayton MD;  Location: [facility]    Repair of nasal septum  12/21/2012    Procedure: STEREOTACTIC IMAGE GUIDANCE, SINUS ENDOSCOPY ANTROSTOMIES;   Surgeon: Corina Clayton MD;  Location: [facility]    Scan proc cranial extra  12/21/2012    Procedure: STEREOTACTIC IMAGE GUIDANCE, SINUS ENDOSCOPY ANTROSTOMIES;  Surgeon: Corina Clayton MD;  [facility]      Social History:  Social History     Socioeconomic History    Marital status:    Tobacco Use    Smoking status: Never    Smokeless tobacco: Never   Vaping Use    Vaping status: Never Used   Substance and Sexual Activity    Alcohol use: No    Drug use: No    Sexual activity: Not Currently   Other Topics Concern    Reaction to local anesthetic No   Social History Narrative            2 kids        Diet: could be better, enough fruits/veggies, mainly cook    Exercise: nothing formal    Sleep; well    Stress: managable        Periods: regular on bcp      Family History   Problem Relation Age of Onset    Cancer Father         prostate    Diabetes Neg     Heart Disease Neg       Allergies[1]     REVIEW OF SYSTEMS:   Review of Systems   Review of Systems   Constitutional: Negative for activity change, appetite change and fever.   HENT: Negative for congestion and voice change.    Respiratory: Negative for cough and shortness of breath.    Cardiovascular: Negative for chest pain.   Gastrointestinal: Negative for abdominal distention, abdominal pain and vomiting.   Genitourinary: Negative for hematuria.   Skin: Negative for wound.   Psychiatric/Behavioral: Negative for behavioral problems.   Wt Readings from Last 5 Encounters:   12/11/24 240 lb (108.9 kg)   10/30/24 242 lb (109.8 kg)   10/19/23 240 lb (108.9 kg)   10/04/22 231 lb (104.8 kg)   02/19/22 219 lb 1.6 oz (99.4 kg)     Body mass index is 43.6 kg/m².      EXAM:   /86   Pulse 98   Ht 5' 2.21\" (1.58 m)   Wt 240 lb (108.9 kg)   LMP 11/25/2024 (Approximate)   BMI 43.60 kg/m²   Physical Exam   Constitutional:       Appearance: Normal appearance.   HENT:      Head:  Normocephalic.   Eyes:      Conjunctiva/sclera: Conjunctivae normal.   Cardiovascular:      Rate and Rhythm: Normal rate and regular rhythm.      Heart sounds: Normal heart sounds. No murmur heard.  Pulmonary:      Effort: Pulmonary effort is normal.      Breath sounds: Normal breath sounds. No rhonchi or rales.   Abdominal:      General: Bowel sounds are normal.      Palpations: Abdomen is soft.      Tenderness: There is no abdominal tenderness.   Musculoskeletal:      Cervical back: Neck supple.      Right lower leg: No edema.      Left lower leg: No edema.   Skin:     General: Skin is warm and dry.   Neurological:      General: No focal deficit present.      Mental Status: He is alert and oriented to person, place, and time. Mental status is at baseline.   Psychiatric:         Mood and Affect: Mood normal.         Behavior: Behavior normal.   Bilateral barefoot skin diabetic exam is normal, visualized feet and the appearance is normal.  Bilateral monofilament/sensation of both feet is normal.  Pulsation pedal pulse exam of both lower legs/feet is normal as well.       ASSESSMENT AND PLAN:   1. Type 2 diabetes mellitus without complication, without long-term current use of insulin (HCC)  - dietary counseling  - increase hydration   - check fastin glabs in three months  - increase ozempic to 0.5 mg and maintain for 2-3 months  - Patient to send me her blood glucose readings and josesito via [employer] in 1 month. Will decide whether we need to increase ozempic or continue with 0.5 mg.   - Microalb/Creat Ratio, Random Urine; Future  - Comp Metabolic Panel (14); Future  - Hemoglobin A1C; Future    2. Morbid obesity due to excess calories (HCC)  - diet and exercise counseling  - increase hydration       The patient indicates understanding of these issues and agrees to the plan.  Follow up in 3 months    Cordelia Fox MD        [1] No Active Allergies

## 2024-12-14 ENCOUNTER — PATIENT MESSAGE (OUTPATIENT)
Dept: INTERNAL MEDICINE CLINIC | Facility: CLINIC | Age: 49
End: 2024-12-14

## 2025-01-02 ENCOUNTER — OFFICE VISIT (OUTPATIENT)
Dept: INTERNAL MEDICINE CLINIC | Facility: CLINIC | Age: 50
End: 2025-01-02
Payer: COMMERCIAL

## 2025-01-02 VITALS
SYSTOLIC BLOOD PRESSURE: 125 MMHG | OXYGEN SATURATION: 98 % | DIASTOLIC BLOOD PRESSURE: 84 MMHG | TEMPERATURE: 98 F | BODY MASS INDEX: 44 KG/M2 | RESPIRATION RATE: 16 BRPM | WEIGHT: 241 LBS | HEART RATE: 79 BPM

## 2025-01-02 DIAGNOSIS — H60.93 OTITIS EXTERNA OF BOTH EARS, UNSPECIFIED CHRONICITY, UNSPECIFIED TYPE: Primary | ICD-10-CM

## 2025-01-02 PROCEDURE — 99213 OFFICE O/P EST LOW 20 MIN: CPT | Performed by: NURSE PRACTITIONER

## 2025-01-02 PROCEDURE — 3079F DIAST BP 80-89 MM HG: CPT | Performed by: NURSE PRACTITIONER

## 2025-01-02 PROCEDURE — 3074F SYST BP LT 130 MM HG: CPT | Performed by: NURSE PRACTITIONER

## 2025-01-02 PROCEDURE — 90471 IMMUNIZATION ADMIN: CPT | Performed by: NURSE PRACTITIONER

## 2025-01-02 PROCEDURE — 90750 HZV VACC RECOMBINANT IM: CPT | Performed by: NURSE PRACTITIONER

## 2025-01-02 RX ORDER — NEOMYCIN SULFATE, POLYMYXIN B SULFATE, HYDROCORTISONE 3.5; 10000; 1 MG/ML; [USP'U]/ML; MG/ML
4 SOLUTION/ DROPS AURICULAR (OTIC) 4 TIMES DAILY
Qty: 10 ML | Refills: 0 | Status: SHIPPED | OUTPATIENT
Start: 2025-01-02 | End: 2025-01-09

## 2025-01-02 NOTE — PROGRESS NOTES
Crista Ortiz is a 49 year old female.  HPI:   Pt c/o bilateral ear pain. Reports it started with discomfort 1 month ago. Reports R>L. States she was given cipro-hydrocortisone drops but they were expensive and did not purchase them.     Denies any CP, SOB, ear drainage, fever, chills, body aches, dizziness, HA, sinus pressure or pain, runny nose, Did not test for covid.   Current Outpatient Medications   Medication Sig Dispense Refill   • neomycin-polymyxin-hydrocortisone 3.5-59673-9 Otic Solution Place 4 drops into both ears 4 (four) times daily for 7 days. 10 mL 0   • semaglutide (OZEMPIC, 0.25 OR 0.5 MG/DOSE,) 2 MG/3ML Subcutaneous Solution Pen-injector Inject 0.5 mg into the skin once a week. 3 mL 3   • losartan 25 MG Oral Tab Take 1 tablet (25 mg total) by mouth daily. 90 tablet 3   • Ferrous Sulfate 325 (65 Fe) MG Oral Tab Take 1 tablet (325 mg total) by mouth daily with breakfast. 90 tablet 1   • Blood Glucose Monitoring Suppl (BLOOD GLUCOSE MONITOR SYSTEM) w/Device Does not apply Kit Test once daily 1 kit 0   • Lancets Does not apply Misc Test once daily 100 each 5   • Glucose Blood (BLOOD GLUCOSE TEST STRIPS 333) In Vitro Strip 1 each by In Vitro route daily. 100 strip 5   • Blood Glucose Calibration (GLUCOSE CONTROL) In Vitro Solution Test once daily 1 each 5   • albuterol (PROAIR HFA) 108 (90 Base) MCG/ACT Inhalation Aero Soln Inhale 2 puffs into the lungs every 6 (six) hours as needed for Wheezing. 8 g 3   • montelukast 10 MG Oral Tab Take 1 tablet (10 mg total) by mouth daily. 90 tablet 3   • ketoconazole (NIZORAL) 2 % External Shampoo Use 3 times weekly.  Leave on 5 min, then rinse. 120 mL 3      Past Medical History:   • Anemia   • Migraine      Social History:  Social History     Socioeconomic History   • Marital status:    Tobacco Use   • Smoking status: Never   • Smokeless tobacco: Never   Vaping Use   • Vaping status: Never Used   Substance and Sexual Activity   • Alcohol use: No   •  Drug use: No   • Sexual activity: Not Currently   Other Topics Concern   • Reaction to local anesthetic No   Social History Narrative            2 kids        Diet: could be better, enough fruits/veggies, mainly cook    Exercise: nothing formal    Sleep; well    Stress: managable        Periods: regular on bcp        REVIEW OF SYSTEMS:   Review of Systems   All other systems reviewed and are negative.         EXAM:   /84 (BP Location: Left arm, Patient Position: Sitting, Cuff Size: large)   Pulse 79   Temp 98 °F (36.7 °C)   Resp 16   Wt 241 lb (109.3 kg)   LMP 11/25/2024 (Approximate)   SpO2 98%   BMI 43.78 kg/m²     Physical Exam  Vitals reviewed.   Constitutional:       General: She is not in acute distress.  HENT:      Head: Normocephalic.      Right Ear: Tympanic membrane and external ear normal. Tenderness present. No swelling. No middle ear effusion. Tympanic membrane is not injected, perforated, erythematous, retracted or bulging.      Left Ear: External ear normal. Tenderness present. No swelling.  No middle ear effusion. Tympanic membrane is injected. Tympanic membrane is not perforated, erythematous, retracted or bulging.      Nose: No congestion or rhinorrhea.      Mouth/Throat:      Mouth: Mucous membranes are moist.      Pharynx: Oropharynx is clear. No oropharyngeal exudate or posterior oropharyngeal erythema.   Eyes:      General: No scleral icterus.        Right eye: No discharge.         Left eye: No discharge.      Conjunctiva/sclera: Conjunctivae normal.   Pulmonary:      Effort: Pulmonary effort is normal. No respiratory distress.   Musculoskeletal:      Cervical back: Normal range of motion and neck supple. No rigidity or tenderness.   Lymphadenopathy:      Cervical: No cervical adenopathy.   Skin:     General: Skin is warm.      Coloration: Skin is not jaundiced or pale.      Findings: No erythema.   Neurological:      Mental Status: She is alert and  oriented to person, place, and time.   Psychiatric:         Thought Content: Thought content normal.         Judgment: Judgment normal.          ASSESSMENT AND PLAN:   1. Otitis externa of both ears, unspecified chronicity, unspecified type  -trial of abx otic drops.   -Reviewed ear care.   -If sx persist, consider ENT referral.   - neomycin-polymyxin-hydrocortisone 3.5-89724-4 Otic Solution; Place 4 drops into both ears 4 (four) times daily for 7 days.  Dispense: 10 mL; Refill: 0       The patient indicates understanding of these issues and agrees to the plan.  The patient is asked to return in PRN/follow up with PCP.     The above note was creating using Dragon speech recognition technology. Please excuse any typos.

## 2025-01-08 ENCOUNTER — OFFICE VISIT (OUTPATIENT)
Dept: OTOLARYNGOLOGY | Facility: CLINIC | Age: 50
End: 2025-01-08
Payer: COMMERCIAL

## 2025-01-08 DIAGNOSIS — H93.8X3 PRESSURE SENSATION IN BOTH EARS: ICD-10-CM

## 2025-01-08 DIAGNOSIS — H60.503 ACUTE OTITIS EXTERNA OF BOTH EARS, UNSPECIFIED TYPE: Primary | ICD-10-CM

## 2025-01-08 PROCEDURE — 92504 EAR MICROSCOPY EXAMINATION: CPT | Performed by: STUDENT IN AN ORGANIZED HEALTH CARE EDUCATION/TRAINING PROGRAM

## 2025-01-08 PROCEDURE — 99203 OFFICE O/P NEW LOW 30 MIN: CPT | Performed by: STUDENT IN AN ORGANIZED HEALTH CARE EDUCATION/TRAINING PROGRAM

## 2025-01-08 NOTE — PROGRESS NOTES
Crista Ortiz is a 49 year old female.   Chief Complaint   Patient presents with    Ear Problem     Patient is here for follow up on ear infection x 1 week ago reports was taken medications reports improvement patient would like to be checked one more time to see if is gone the infection.     HPI:   49-year-old presents for evaluation of her ears.  Says that she was treated with otic drops for ear pressure.  She says that for the most part her symptoms are improving still has a little bit of ear pressure in each ear    Current Outpatient Medications   Medication Sig Dispense Refill    neomycin-polymyxin-hydrocortisone 3.5-60314-4 Otic Solution Place 4 drops into both ears 4 (four) times daily for 7 days. 10 mL 0    semaglutide (OZEMPIC, 0.25 OR 0.5 MG/DOSE,) 2 MG/3ML Subcutaneous Solution Pen-injector Inject 0.5 mg into the skin once a week. 3 mL 3    losartan 25 MG Oral Tab Take 1 tablet (25 mg total) by mouth daily. 90 tablet 3    Ferrous Sulfate 325 (65 Fe) MG Oral Tab Take 1 tablet (325 mg total) by mouth daily with breakfast. 90 tablet 1    Blood Glucose Monitoring Suppl (BLOOD GLUCOSE MONITOR SYSTEM) w/Device Does not apply Kit Test once daily 1 kit 0    Lancets Does not apply Misc Test once daily 100 each 5    Glucose Blood (BLOOD GLUCOSE TEST STRIPS 333) In Vitro Strip 1 each by In Vitro route daily. 100 strip 5    Blood Glucose Calibration (GLUCOSE CONTROL) In Vitro Solution Test once daily 1 each 5    albuterol (PROAIR HFA) 108 (90 Base) MCG/ACT Inhalation Aero Soln Inhale 2 puffs into the lungs every 6 (six) hours as needed for Wheezing. 8 g 3    montelukast 10 MG Oral Tab Take 1 tablet (10 mg total) by mouth daily. 90 tablet 3    ketoconazole (NIZORAL) 2 % External Shampoo Use 3 times weekly.  Leave on 5 min, then rinse. 120 mL 3      Past Medical History:    Anemia    Migraine      Social History:  Social History     Socioeconomic History    Marital status:    Tobacco Use    Smoking status:  Never    Smokeless tobacco: Never   Vaping Use    Vaping status: Never Used   Substance and Sexual Activity    Alcohol use: No    Drug use: No    Sexual activity: Not Currently   Other Topics Concern    Reaction to local anesthetic No   Social History Narrative            2 kids        Diet: could be better, enough fruits/veggies, mainly cook    Exercise: nothing formal    Sleep; well    Stress: managable        Periods: regular on bcp      Past Surgical History:   Procedure Laterality Date    Appendectomy      Colonoscopy      D & c      Excision turbinate,submucous  12/21/2012    Procedure: STEREOTACTIC IMAGE GUIDANCE, SINUS ENDOSCOPY ANTROSTOMIES;  Surgeon: Corina Clayton MD;  Location: [facility]    Excision turbinate,submucous  12/21/2012    Procedure: STEREOTACTIC IMAGE GUIDANCE, SINUS ENDOSCOPY ANTROSTOMIES;  Surgeon: Corina Clayton MD;  Location: [facility]    Nasal scopy,explor frontal sinus  12/21/2012    Procedure: STEREOTACTIC IMAGE GUIDANCE, SINUS ENDOSCOPY ANTROSTOMIES;  Surgeon: Corina Clayton MD;  Location: [facility]    Nasal scopy,explor frontal sinus  12/21/2012    Procedure: STEREOTACTIC IMAGE GUIDANCE, SINUS ENDOSCOPY ANTROSTOMIES;  Surgeon: Corina Clayton MD;  Location: [facility]    Nasal scopy,open maxill sinus  12/21/2012    Procedure: STEREOTACTIC IMAGE GUIDANCE, SINUS ENDOSCOPY ANTROSTOMIES;  Surgeon: Corina Clayton MD;  Location: [facility]    Nasal scopy,remv totl ethmoid  12/21/2012    Procedure: STEREOTACTIC IMAGE GUIDANCE, SINUS ENDOSCOPY ANTROSTOMIES;  Surgeon: Corina Clayton MD;  Location: [facility]    Nasal scopy,remv totl ethmoid  12/21/2012    Procedure: STEREOTACTIC IMAGE GUIDANCE, SINUS ENDOSCOPY ANTROSTOMIES;  Surgeon: Corina Clayton MD;  Location: [facility]     Nasal scopy,rmv tiss maxill sinus  12/21/2012    Procedure: STEREOTACTIC IMAGE GUIDANCE, SINUS ENDOSCOPY ANTROSTOMIES;  Surgeon: Corina Clayton MD;  Location: [facility]    Nasal scopy,sphenoidotomy  12/21/2012    Procedure: STEREOTACTIC IMAGE GUIDANCE, SINUS ENDOSCOPY ANTROSTOMIES;  Surgeon: Corina Clayton MD;  Location: [facility]    Nasal/sinus scopy,rmv jus bull  12/21/2012    Procedure: STEREOTACTIC IMAGE GUIDANCE, SINUS ENDOSCOPY ANTROSTOMIES;  Surgeon: Corina Clayton MD;  Location: [facility]    Repair of nasal septum  12/21/2012    Procedure: STEREOTACTIC IMAGE GUIDANCE, SINUS ENDOSCOPY ANTROSTOMIES;  Surgeon: Corina Clayton MD;  Location: [facility]    Scan proc cranial extra  12/21/2012    Procedure: STEREOTACTIC IMAGE GUIDANCE, SINUS ENDOSCOPY ANTROSTOMIES;  Surgeon: Corina Clayton MD;  Location: [facility]         EXAM:   LMP 11/25/2024 (Approximate)     System Details   Skin Inspection - Normal.   Constitutional Overall appearance - Normal.   Head/Face Symmetric, TMJ tenderness not present    Eyes EOMI, PERRL   Right ear:  Canal clear, TM intact, no MURRAY   Left ear:  Canal clear, TM intact, no MURRAY   Nose: Septum midline, inferior turbinates not enlarged, nasal valves without collapse    Oral cavity/Oropharynx: No lesions or masses on inspection or palpation, tonsils symmetric    Neck: Soft without LAD, thyroid not enlarged  Voice clear/ no stridor   Other:      SCOPES AND PROCEDURES:         AUDIOGRAM AND IMAGING:         IMPRESSION:   1. Acute otitis externa of both ears, unspecified type    2. Pressure sensation in both ears       Recommendations:  -Had a normal ear exam today there is no evidence of otitis externa or inflammation of the ear canal or eardrum  -Suspect may be an alternative cause such as a TMD flare given the normal ear exam and her positive response  to NSAIDs  -If this flares back up again she should return so I can examine her ear while the process is active    The patient indicates understanding of these issues and agrees to the plan.  Consult from Dr Hansen regarding ear evaluation    Florian Cadena MD  1/8/2025  4:56 PM

## 2025-01-12 ENCOUNTER — PATIENT MESSAGE (OUTPATIENT)
Dept: INTERNAL MEDICINE CLINIC | Facility: CLINIC | Age: 50
End: 2025-01-12

## 2025-01-13 RX ORDER — SEMAGLUTIDE 0.68 MG/ML
0.5 INJECTION, SOLUTION SUBCUTANEOUS WEEKLY
Qty: 3 ML | Refills: 3 | Status: SHIPPED | OUTPATIENT
Start: 2025-01-13

## 2025-01-13 NOTE — TELEPHONE ENCOUNTER
I think, ozempic 0.5 mg is working well for you. Lets continue with 0.5 mg for three months and than we can consider increasing it if needed. During Ramadan, its ok to continue with 0.5 mg. Take once a week after iftar.   Thank you   Cordelia Fox M.D.

## 2025-01-29 NOTE — H&P
[facility] - Gastroenterology                                                                                                               Reason for consult: eval    Requesting physician or provider: Cordelia Fox MD    Chief Complaint   Patient presents with    Colonoscopy Screening     Previous Colonoscopy with Dr Zelaya about 15 years ago     Consult     Anemia        HPI:   Crista Ortiz is a 49 year old year-old female with history of anemia, migraine:    she is here today for evaluation prior to cln    she moves her bowels daily. she denies brbpr and/or melena.  Has dark stools with oral iron supplement    Occasional gerd improved with prilosec and/or tums.  she denies dysphagia, odynophagia and/or globus. she denies abdominal pain. she denies nausea and/or vomiting.  she denies recent change in appetite and/or unintentional weight loss.    Noted polo - hgb stable  Currently on oral iron supplement  She endorses heavy periods - hasn't seen gynecology     NSAIDS: no  Tobacco: no  Alcohol: no  Marijuana: no  Illicit drugs: no    No FH GI malignancy, IBD, celiac    No history of adverse reaction to sedation  No ALEXIS  No anticoagulants  No pacemaker/defibrillator  No pain medications and/or sleep aides      Last colonoscopy: she recalls having a c-scope around this time that was normal    EGD 12/2015      IMPRESSION  1.  Minimal esophageal changes, possible mild reflux esophagitis.  2.  Gastric erythema. Rule out H. pylori or other infiltrative   process.     RECOMMENDATIONS  1.  Check pathology results.  2.  Start empiric pantoprazole 40 mg daily (ERx sent).  3.  Antireflux precautions reinforced with the patient.   FINAL DIAGNOSIS                      A. Gastric, biopsy:    *  Mild chronic gastritis.    *  Diff-Quik stain (with appropriate control reactivity) shows no definitive  evidence of Helicobacter pylori-like microorganisms.    *  No evidence of dysplasia or  malignancy identified.     B. Esophagus, biopsy;    *  Fragments of gastric glandular type mucosa with mild chronic inflammation.    *  Associated and separate fragments of squamous mucosa with features of  reflux esophagitis.    *  No evidence of intestinal metaplasia, dysplasia or malignancy identified.     Wt Readings from Last 6 Encounters:   02/03/25 236 lb (107 kg)   01/02/25 241 lb (109.3 kg)   12/11/24 240 lb (108.9 kg)   10/30/24 242 lb (109.8 kg)   10/19/23 240 lb (108.9 kg)   10/04/22 231 lb (104.8 kg)        History, Medications, Allergies, ROS:      Past Medical History:    Anemia    Migraine      Past Surgical History:   Procedure Laterality Date    Appendectomy      Colonoscopy  2010    Dr Zelaya    D & c      Excision turbinate,submucous  12/21/2012    Procedure: STEREOTACTIC IMAGE GUIDANCE, SINUS ENDOSCOPY ANTROSTOMIES;  Surgeon: Corina Clayton MD;  Location: [facility]    Excision turbinate,submucous  12/21/2012    Procedure: STEREOTACTIC IMAGE GUIDANCE, SINUS ENDOSCOPY ANTROSTOMIES;  Surgeon: Corina Clayton MD;  Location: [facility], [facility]    Nasal scopy,explor frontal sinus  12/21/2012    Procedure: STEREOTACTIC IMAGE GUIDANCE, SINUS ENDOSCOPY ANTROSTOMIES;  Surgeon: Corina Clayton MD;  Location: [facility], [facility]    Nasal scopy,explor frontal sinus  12/21/2012    Procedure: STEREOTACTIC IMAGE GUIDANCE, SINUS ENDOSCOPY ANTROSTOMIES;  Surgeon: Corina Clayton MD;  Location: [facility], [facility]    Nasal scopy,open maxill sinus  12/21/2012    Procedure: STEREOTACTIC IMAGE GUIDANCE, SINUS ENDOSCOPY ANTROSTOMIES;  Surgeon: Corina Clayton MD;  Location: [facility], [facility]    Nasal scopy,remv totl ethmoid  12/21/2012    Procedure: STEREOTACTIC IMAGE GUIDANCE, SINUS ENDOSCOPY ANTROSTOMIES;  Surgeon: Corina Clayton MD;  Location: [facility], [facility]    Nasal scopy,remv totl ethmoid   12/21/2012    Procedure: STEREOTACTIC IMAGE GUIDANCE, SINUS ENDOSCOPY ANTROSTOMIES;  Surgeon: Corina Clayton MD;  Location: [facility], [facility]    Nasal scopy,rmv tiss maxill sinus  12/21/2012    Procedure: STEREOTACTIC IMAGE GUIDANCE, SINUS ENDOSCOPY ANTROSTOMIES;  Surgeon: Corina Clayton MD;  Location: [facility]    Nasal scopy,sphenoidotomy  12/21/2012    Procedure: STEREOTACTIC IMAGE GUIDANCE, SINUS ENDOSCOPY ANTROSTOMIES;  Surgeon: Corina Clayton MD;  Location: [facility]    Nasal/sinus scopy,rmv jus bull  12/21/2012    Procedure: STEREOTACTIC IMAGE GUIDANCE, SINUS ENDOSCOPY ANTROSTOMIES;  Surgeon: Corina Clayton MD;  Location: [facility]    Repair of nasal septum  12/21/2012    Procedure: STEREOTACTIC IMAGE GUIDANCE, SINUS ENDOSCOPY ANTROSTOMIES;  Surgeon: Corina Clayton MD;  Location: [facility]    Scan proc cranial extra  12/21/2012    Procedure: STEREOTACTIC IMAGE GUIDANCE, SINUS ENDOSCOPY ANTROSTOMIES;  Surgeon: Corina Clayton MD;  Location: [facility]      Family Hx:   Family History   Problem Relation Age of Onset    Cancer Father         prostate    Diabetes Neg     Heart Disease Neg     Colon Cancer Neg       Social History:   Social History     Socioeconomic History    Marital status:    Tobacco Use    Smoking status: Never    Smokeless tobacco: Never   Vaping Use    Vaping status: Never Used   Substance and Sexual Activity    Alcohol use: No    Drug use: No    Sexual activity: Not Currently   Other Topics Concern    Reaction to local anesthetic No   Social History Narrative            2 kids        Diet: could be better, enough fruits/veggies, mainly cook    Exercise: nothing formal    Sleep; well    Stress: managable        Periods: regular on bcp        Medications (Active prior to today's visit):  Current  Outpatient Medications   Medication Sig Dispense Refill    PEG 3350-KCl-Na Bicarb-NaCl (TRILYTE) 420 g Oral Recon Soln Take prep as directed by gastro office. May substitute with Trilyte/generic equivalent if needed. 4000 mL 0    semaglutide (OZEMPIC, 0.25 OR 0.5 MG/DOSE,) 2 MG/3ML Subcutaneous Solution Pen-injector Inject 0.5 mg into the skin once a week. 3 mL 3    losartan 25 MG Oral Tab Take 1 tablet (25 mg total) by mouth daily. 90 tablet 3    Ferrous Sulfate 325 (65 Fe) MG Oral Tab Take 1 tablet (325 mg total) by mouth daily with breakfast. 90 tablet 1    Blood Glucose Monitoring Suppl (BLOOD GLUCOSE MONITOR SYSTEM) w/Device Does not apply Kit Test once daily 1 kit 0    Lancets Does not apply Misc Test once daily 100 each 5    Glucose Blood (BLOOD GLUCOSE TEST STRIPS 333) In Vitro Strip 1 each by In Vitro route daily. 100 strip 5    Blood Glucose Calibration (GLUCOSE CONTROL) In Vitro Solution Test once daily 1 each 5    albuterol (PROAIR HFA) 108 (90 Base) MCG/ACT Inhalation Aero Soln Inhale 2 puffs into the lungs every 6 (six) hours as needed for Wheezing. 8 g 3    montelukast 10 MG Oral Tab Take 1 tablet (10 mg total) by mouth daily. 90 tablet 3    ketoconazole (NIZORAL) 2 % External Shampoo Use 3 times weekly.  Leave on 5 min, then rinse. 120 mL 3       Allergies:  Allergies[1]    ROS:   CONSTITUTIONAL: negative for fevers, chills, sweats and weight loss  EYES Negative for red eyes, yellow eyes, changes in vision  HEENT: Negative for dysphagia and hoarseness  RESPIRATORY: Negative for cough and shortness of breath  CARDIOVASCULAR: Negative for chest pain, palpitations  GASTROINTESTINAL: See HPI  GENITOURINARY: Negative for dysuria and frequency  MUSCULOSKELETAL: Negative for arthralgias and myalgias  NEUROLOGICAL: Negative for dizziness and headaches  BEHAVIOR/PSYCH: Negative for anxiety and poor appetite    PHYSICAL EXAM:   Blood pressure 112/78, pulse 97, height 5' 2\" (1.575 m), weight 236 lb (107 kg),  last menstrual period 11/25/2024, not currently breastfeeding.    GEN: WD/WN, NAD  HEENT: Supple symmetrical, trachea midline  CV: RRR, the extremities are warm and well perfused   LUNGS: No increased work of breathing  ABDOMEN: No scars, normal bowel sounds, soft, non-tender, non-distended no rebound or guarding, no masses, no hepatomegaly  MSK: No redness, no warmth, no swelling of joints  SKIN: No jaundice, no erythema, no rashes  HEMATOLOGIC: No bleeding, no bruising  NEURO: Alert and interactive, normal gait    Labs/Imaging/Procedures:     Patient's pertinent labs and imaging were reviewed and discussed with patient today.        .  ASSESSMENT/PLAN:   Crista Ortiz is a 49 year old year-old female with history of anemia, migraine:    #polo  #crc screening  #gerd  Chronic gerd improved with prilosec when she takes it. Noted stable polo in setting of heavy periods and suspect is likely gyne in etiology. No overt sigs/sx gi bleeding. No fhx gi malignancy. Last egd 2015- mild reflux changes. She reports cln around this time and was normal  Plan as below     -see gyne  -continue oral iron supplement  -reflux diet modification  -pepcid as needed    1. Schedule colonoscopy/egd with MAC w/ General pool MD [Diagnosis:polo, gerd, crc screening]    2.  bowel prep from pharmacy (split trilyte)    3. Hold ozempic, iron supplement 7 days prior to procedure  For cardiology patients and patients on blood thinners:  Please contact your cardiology clinic for clearance to proceed with the endoscopic procedure. If you are on blood thinners, please also confirm with your cardiologic clinic that you are able to hold the blood thinner per our recommendations.\"    BLOOD THINNER ORDERS:  -Hold for 48 hours (Xarelto, Eliquis, Pradaxa, Savaysa)  -Hold for 3 days (Pletal)  -Hold for 5 days (Coumadin, Plavix, Brilinta, Aggrenox)  -Hold for 7 days (Effient)     For endocrinology insulin patients:    Please contact your endocrinology  clinic for insulin adjustment orders prior to your endoscopic procedure.    4. Read all bowel prep instructions carefully    5. AVOID seeds, nuts, popcorn, raw fruits and vegetables (cooked is okay) for 2-3 days before procedure    6.   If you start any NEW medication after your visit today, please notify us. Certain medications will need to be held before the procedure, or the procedure cannot be performed.     >>>Please note: if you were prescribed Suprep for the bowel prep and it is too expensive or not covered by insurance, it is okay to substitute Trilyte (or any similar generic prep). This can be done by notifying the pharmacy or calling our office.        Orders This Visit:  No orders of the defined types were placed in this encounter.      Meds This Visit:  Requested Prescriptions     Signed Prescriptions Disp Refills    PEG 3350-KCl-Na Bicarb-NaCl (TRILYTE) 420 g Oral Recon Soln 4000 mL 0     Sig: Take prep as directed by gastro office. May substitute with Trilyte/generic equivalent if needed.       Imaging & Referrals:  None    ENDOSCOPIC RISK BENEFIT DISCUSSION: I described the procedure in great detail with the patient. I discussed the risks and benefits, including but not limited to: bleeding, perforation, infection, anesthesia complications, and even death. Patient will be NPO after midnight and will have a person physically present at time of pick-up to drive patient home. Patient verbalized understanding and agrees to proceed with procedure as planned.    Yanelis Ladd, APRN   1/29/2025        This note was partially prepared using Dragon Medical voice recognition dictation software. As a result, errors may occur. When identified, these errors have been corrected. While every attempt is made to correct errors during dictation, discrepancies may still exist.          [1] No Active Allergies

## 2025-02-01 ENCOUNTER — LAB ENCOUNTER (OUTPATIENT)
Dept: LAB | Facility: HOSPITAL | Age: 50
End: 2025-02-01
Attending: INTERNAL MEDICINE
Payer: COMMERCIAL

## 2025-02-01 DIAGNOSIS — E11.9 TYPE 2 DIABETES MELLITUS WITHOUT COMPLICATION, WITHOUT LONG-TERM CURRENT USE OF INSULIN (HCC): ICD-10-CM

## 2025-02-01 LAB
ALBUMIN SERPL-MCNC: 4.5 G/DL (ref 3.2–4.8)
ALBUMIN/GLOB SERPL: 1.3 {RATIO} (ref 1–2)
ALP LIVER SERPL-CCNC: 62 U/L
ALT SERPL-CCNC: 28 U/L
ANION GAP SERPL CALC-SCNC: 7 MMOL/L (ref 0–18)
AST SERPL-CCNC: 27 U/L (ref ?–34)
BILIRUB SERPL-MCNC: 0.3 MG/DL (ref 0.3–1.2)
BUN BLD-MCNC: 9 MG/DL (ref 9–23)
BUN/CREAT SERPL: 11 (ref 10–20)
CALCIUM BLD-MCNC: 9.2 MG/DL (ref 8.7–10.4)
CHLORIDE SERPL-SCNC: 103 MMOL/L (ref 98–112)
CO2 SERPL-SCNC: 28 MMOL/L (ref 21–32)
CREAT BLD-MCNC: 0.82 MG/DL
CREAT UR-SCNC: 169.1 MG/DL
EGFRCR SERPLBLD CKD-EPI 2021: 87 ML/MIN/1.73M2 (ref 60–?)
EST. AVERAGE GLUCOSE BLD GHB EST-MCNC: 137 MG/DL (ref 68–126)
FASTING STATUS PATIENT QL REPORTED: YES
GLOBULIN PLAS-MCNC: 3.4 G/DL (ref 2–3.5)
GLUCOSE BLD-MCNC: 88 MG/DL (ref 70–99)
HBA1C MFR BLD: 6.4 % (ref ?–5.7)
MICROALBUMIN UR-MCNC: 16.4 MG/DL
MICROALBUMIN/CREAT 24H UR-RTO: 97 UG/MG (ref ?–30)
OSMOLALITY SERPL CALC.SUM OF ELEC: 284 MOSM/KG (ref 275–295)
POTASSIUM SERPL-SCNC: 3.9 MMOL/L (ref 3.5–5.1)
PROT SERPL-MCNC: 7.9 G/DL (ref 5.7–8.2)
SODIUM SERPL-SCNC: 138 MMOL/L (ref 136–145)

## 2025-02-01 PROCEDURE — 82570 ASSAY OF URINE CREATININE: CPT

## 2025-02-01 PROCEDURE — 82043 UR ALBUMIN QUANTITATIVE: CPT

## 2025-02-01 PROCEDURE — 36415 COLL VENOUS BLD VENIPUNCTURE: CPT

## 2025-02-01 PROCEDURE — 83036 HEMOGLOBIN GLYCOSYLATED A1C: CPT

## 2025-02-01 PROCEDURE — 80053 COMPREHEN METABOLIC PANEL: CPT

## 2025-02-03 ENCOUNTER — TELEPHONE (OUTPATIENT)
Facility: CLINIC | Age: 50
End: 2025-02-03

## 2025-02-03 ENCOUNTER — OFFICE VISIT (OUTPATIENT)
Facility: CLINIC | Age: 50
End: 2025-02-03
Payer: COMMERCIAL

## 2025-02-03 VITALS
HEIGHT: 62 IN | HEART RATE: 97 BPM | SYSTOLIC BLOOD PRESSURE: 112 MMHG | BODY MASS INDEX: 43.43 KG/M2 | WEIGHT: 236 LBS | DIASTOLIC BLOOD PRESSURE: 78 MMHG

## 2025-02-03 DIAGNOSIS — D50.9 IRON DEFICIENCY ANEMIA, UNSPECIFIED IRON DEFICIENCY ANEMIA TYPE: Primary | ICD-10-CM

## 2025-02-03 DIAGNOSIS — K21.9 GASTROESOPHAGEAL REFLUX DISEASE, UNSPECIFIED WHETHER ESOPHAGITIS PRESENT: ICD-10-CM

## 2025-02-03 DIAGNOSIS — Z12.11 COLON CANCER SCREENING: ICD-10-CM

## 2025-02-03 RX ORDER — POLYETHYLENE GLYCOL 3350, SODIUM CHLORIDE, SODIUM BICARBONATE, POTASSIUM CHLORIDE 420; 11.2; 5.72; 1.48 G/4L; G/4L; G/4L; G/4L
POWDER, FOR SOLUTION ORAL
Qty: 4000 ML | Refills: 0 | Status: SHIPPED | OUTPATIENT
Start: 2025-02-03

## 2025-02-03 NOTE — TELEPHONE ENCOUNTER
Patient was seen in office today (2/3/2025) by CARMEN Hilario. Provided patient with office number and prep instructions. Reviewed prep instructions with patient in office, verbalized understanding. Patient aware GI schedulers will call patient to schedule the procedure.      Offered appointments in February, pt declined. Pt Will call office to schedule. Prefers female provider/staff      Procedure orders:    Schedule: Colonoscopy and Esophagogastroduodenoscopy (EGD) with FEMALE [facility] Endoscopist   Diagnosis:     ICD-10-CM   1. Iron deficiency anemia, unspecified iron deficiency anemia type  D50.9   2. Colon cancer screening  Z12.11   3. Gastroesophageal reflux disease, unspecified whether esophagitis present  K21.9      Sedation: MAC   Prep: Split TriLyte    Medication changes prior to procedure:   Hold Ozempic 7 days prior to procedure

## 2025-02-03 NOTE — PATIENT INSTRUCTIONS
-see gyne  -continue oral iron supplement  -reflux diet modification  -pepcid as needed    1. Schedule colonoscopy/egd with MAC w/ General pool MD [Diagnosis:polo, gerd, crc screening]    2.  bowel prep from pharmacy (split trilyte)    3. Hold ozempic, iron supplement 7 days prior to procedure  For cardiology patients and patients on blood thinners:  Please contact your cardiology clinic for clearance to proceed with the endoscopic procedure. If you are on blood thinners, please also confirm with your cardiologic clinic that you are able to hold the blood thinner per our recommendations.\"    BLOOD THINNER ORDERS:  -Hold for 48 hours (Xarelto, Eliquis, Pradaxa, Savaysa)  -Hold for 3 days (Pletal)  -Hold for 5 days (Coumadin, Plavix, Brilinta, Aggrenox)  -Hold for 7 days (Effient)     For endocrinology insulin patients:    Please contact your endocrinology clinic for insulin adjustment orders prior to your endoscopic procedure.    4. Read all bowel prep instructions carefully    5. AVOID seeds, nuts, popcorn, raw fruits and vegetables (cooked is okay) for 2-3 days before procedure    6.   If you start any NEW medication after your visit today, please notify us. Certain medications will need to be held before the procedure, or the procedure cannot be performed.     >>>Please note: if you were prescribed Suprep for the bowel prep and it is too expensive or not covered by insurance, it is okay to substitute Trilyte (or any similar generic prep). This can be done by notifying the pharmacy or calling our office.        Tips to Control Acid Reflux    To control acid reflux, you’ll need to make some basic diet and lifestyle changes. The simple steps outlined below may be all you’ll need to ease discomfort.   Watch what you eat  Don't have fatty foods or spicy foods.  Eat fewer acidic foods, such as citrus and tomato-based foods. These can increase symptoms.  Limit drinking alcohol, caffeine, and fizzy beverages. All  increase acid reflux.  Try limiting chocolate, peppermint, and spearmint. These can make acid reflux worse in some people.     Watch when you eat  Don't lie down for 3 hours after eating.  Don't snack before going to bed.     Raise your head  Raising your head and upper body by 4 to 6 inches helps limit reflux when you’re lying down. Put blocks under the head of your bed frame or a wedge under your mattress to raise it.   Other changes  Lose weight, if you need to  Don’t exercise near bedtime  Don't wear tight-fitting clothes  Limit aspirin and ibuprofen  Stop smoking     [employer] last reviewed this educational content on 6/1/2019  © 7740-2642 [employer]. [address] [place] [address], [place] [address]. All rights reserved. This information is not intended as a substitute for professional medical care. Always follow your healthcare professional's instructions.

## 2025-02-05 NOTE — TELEPHONE ENCOUNTER
Scheduled for:  Colonoscopy 37528    Provider Name:   Muriel    Date:  4/21/2025    Location:    [facility]    Sedation:  MAC    Time:  3:10 pm (Patient made aware EM will call the day before with procedure/arrival time)    Prep:  Trilyte    Meds/Allergies Reconciled?:  Physician reviewed     Diagnosis with codes:    lisa deficiency anemia, unspecified iron deficiency anemia type [D50.9]   Colon cancer screening [Z12.11]   Gastroesophageal reflux disease, unspecified whether esophagitis present [K21.9]     Was patient informed to call insurance with codes (Y/N):  Yes, I confirmed [employer] insurance with the patient.     Referral sent?:  N/A    EM or [facility] notified?:  I sent an electronic request to Endo Scheduling and received a confirmation today.      Medication Orders:  This patient verbally confirmed that she is not taking:    blood thinners, BP meds, and is not diabetic   No latex allergy, No PCN allergy and does not have a pacemaker     Misc Orders:    Hold Iron 7 days prior to procedure     Further instructions given by staff:   I discussed the prep instructions with the patient which she verbally understood and is aware that I will send the instructions today via [employer].    Advised patient:    You will not be able to drive, operate machinery or make critical decisions the day of your procedure. Please make arrangements for transportation. You must have a  (age 18 or older) to accompany you, stay in the facility for the duration of your procedure and drive you home after the procedure.  You cannot use public transportation (Uber, Lyft, Taxi). The procedure involves sedation, and you will not be allowed to leave unaccompanied. Your procedure will not proceed forward if you're unable to confirm your  planned to escort you home.    Advised Patient:    [facility] requires payment of copay and any patient responsibility at the time of registration.   The [facility] requires copay and 50% of the patient responsibility at  the time of service for all Esophagogastroduodenoscopy and diagnostic Colonoscopies.     They do offer payment plans and Care Credit options if unable to pay the full amount at the time of registration.     If you have any questions regarding your potential responsibility, please contact [facility] Insurance Department at [phone] option 1.    You may receive 4 bills related to your medical procedure:   [facility] (the facility)  The procedural physician  The anesthesiologist  The pathology lab (if applicable)

## 2025-02-15 ENCOUNTER — NURSE TRIAGE (OUTPATIENT)
Dept: INTERNAL MEDICINE CLINIC | Facility: CLINIC | Age: 50
End: 2025-02-15

## 2025-02-15 NOTE — TELEPHONE ENCOUNTER
Dr. Fox: Your schedule is booked, please advise if you are able to add on Monday/Tuesday or okay to schedule with alternative provider? Patient will also be reaching out to ENT as she has had some ear concerns in the past and believes this may be the cause     Action Requested: Summary for Provider     []  Critical Lab, Recommendations Needed  [] Need Additional Advice  []   FYI    []   Need Orders  [] Need Medications Sent to Pharmacy  [x]  Other: appt request      SUMMARY: Recommended visit within 3 days     Reason for call: Ear Problem  Onset: 3 days     Contacted patient to discuss, she states if she turns or stands quickly she will feel slightly dizzy. Patient concerned she has fluid in right ear but denies any symptoms to ear. Denies any pain. Afebrile.     Reason for Disposition   MILD dizziness (e.g., walking normally) and has NOT been evaluated by physician for this (Exception: Dizziness caused by heat exposure, sudden standing, or poor fluid intake.)    Protocols used: Dizziness-A-OH

## 2025-03-26 ENCOUNTER — TELEPHONE (OUTPATIENT)
Facility: CLINIC | Age: 50
End: 2025-03-26

## 2025-03-27 ENCOUNTER — TELEPHONE (OUTPATIENT)
Facility: CLINIC | Age: 50
End: 2025-03-27

## 2025-03-27 NOTE — TELEPHONE ENCOUNTER
Left message for patient to call back to reschedule Colonoscopy/EGD     Please transfer call to GI surgery scheduling

## 2025-03-27 NOTE — TELEPHONE ENCOUNTER
The patient is returning the missed call. Was unable to transfer the call. Please call.     See telephone encounter from yesterday

## 2025-04-30 ENCOUNTER — HOSPITAL ENCOUNTER (OUTPATIENT)
Dept: MAMMOGRAPHY | Age: 50
Discharge: HOME | End: 2025-04-30
Attending: INTERNAL MEDICINE
Payer: COMMERCIAL

## 2025-04-30 DIAGNOSIS — Z12.31 ENCOUNTER FOR SCREENING MAMMOGRAM FOR MALIGNANT NEOPLASM OF BREAST: ICD-10-CM

## 2025-04-30 PROCEDURE — 77067 SCR MAMMO BI INCL CAD: CPT | Performed by: INTERNAL MEDICINE

## 2025-04-30 PROCEDURE — 77063 BREAST TOMOSYNTHESIS BI: CPT | Performed by: INTERNAL MEDICINE

## 2025-06-06 ENCOUNTER — TELEPHONE (OUTPATIENT)
Dept: INTERNAL MEDICINE CLINIC | Facility: CLINIC | Age: 50
End: 2025-06-06

## 2025-06-06 NOTE — TELEPHONE ENCOUNTER
[facility], patient due for the following:      Colonoscopy  Pap Smear (after 10/23/25)  Diabetic Eye Exam

## 2025-07-04 RX ORDER — SEMAGLUTIDE 0.68 MG/ML
0.5 INJECTION, SOLUTION SUBCUTANEOUS WEEKLY
Qty: 9 ML | Refills: 0 | Status: SHIPPED | OUTPATIENT
Start: 2025-07-04

## 2025-07-04 RX ORDER — SEMAGLUTIDE 0.68 MG/ML
INJECTION, SOLUTION SUBCUTANEOUS
Qty: 3 ML | Refills: 3 | OUTPATIENT
Start: 2025-07-04

## 2025-07-04 NOTE — TELEPHONE ENCOUNTER
Please review; protocol failed/No Protocol appointment only    Sending 90 day supply last appointment was 6 months ago    Sent [employer] message to patient to schedule

## 2025-08-01 ENCOUNTER — TELEPHONE (OUTPATIENT)
Facility: CLINIC | Age: 50
End: 2025-08-01

## 2025-08-08 ENCOUNTER — TELEPHONE (OUTPATIENT)
Facility: CLINIC | Age: 50
End: 2025-08-08

## 2025-08-11 ENCOUNTER — HOSPITAL ENCOUNTER (OUTPATIENT)
Facility: HOSPITAL | Age: 50
Setting detail: HOSPITAL OUTPATIENT SURGERY
Discharge: HOME | End: 2025-08-11
Attending: INTERNAL MEDICINE | Admitting: INTERNAL MEDICINE

## 2025-08-11 ENCOUNTER — ANESTHESIA (OUTPATIENT)
Dept: ENDOSCOPY | Facility: HOSPITAL | Age: 50
End: 2025-08-11

## 2025-08-11 ENCOUNTER — ANESTHESIA EVENT (OUTPATIENT)
Dept: ENDOSCOPY | Facility: HOSPITAL | Age: 50
End: 2025-08-11

## 2025-08-11 VITALS
SYSTOLIC BLOOD PRESSURE: 106 MMHG | HEART RATE: 77 BPM | HEIGHT: 62 IN | DIASTOLIC BLOOD PRESSURE: 75 MMHG | WEIGHT: 230 LBS | RESPIRATION RATE: 17 BRPM | BODY MASS INDEX: 42.33 KG/M2 | OXYGEN SATURATION: 98 %

## 2025-08-11 DIAGNOSIS — K21.9 GASTROESOPHAGEAL REFLUX DISEASE, UNSPECIFIED WHETHER ESOPHAGITIS PRESENT: ICD-10-CM

## 2025-08-11 DIAGNOSIS — D50.9 IRON DEFICIENCY ANEMIA, UNSPECIFIED IRON DEFICIENCY ANEMIA TYPE: ICD-10-CM

## 2025-08-11 DIAGNOSIS — Z12.11 COLON CANCER SCREENING: ICD-10-CM

## 2025-08-11 PROBLEM — K63.5 POLYP OF COLON: Status: ACTIVE | Noted: 2025-08-11

## 2025-08-11 PROBLEM — K64.9 HEMORRHOIDS: Status: ACTIVE | Noted: 2025-08-11

## 2025-08-11 PROBLEM — K31.7 GASTRIC POLYP: Status: ACTIVE | Noted: 2025-08-11

## 2025-08-11 LAB — GLUCOSE BLDC GLUCOMTR-MCNC: 113 MG/DL (ref 70–99)

## 2025-08-11 PROCEDURE — 45380 COLONOSCOPY AND BIOPSY: CPT | Performed by: INTERNAL MEDICINE

## 2025-08-11 PROCEDURE — 43239 EGD BIOPSY SINGLE/MULTIPLE: CPT | Performed by: INTERNAL MEDICINE

## 2025-08-11 RX ORDER — SODIUM CHLORIDE, SODIUM LACTATE, POTASSIUM CHLORIDE, CALCIUM CHLORIDE 600; 310; 30; 20 MG/100ML; MG/100ML; MG/100ML; MG/100ML
INJECTION, SOLUTION INTRAVENOUS CONTINUOUS
Status: DISCONTINUED | OUTPATIENT
Start: 2025-08-11 | End: 2025-08-11

## 2025-08-11 RX ORDER — LIDOCAINE HYDROCHLORIDE 10 MG/ML
INJECTION, SOLUTION EPIDURAL; INFILTRATION; INTRACAUDAL; PERINEURAL AS NEEDED
Status: DISCONTINUED | OUTPATIENT
Start: 2025-08-11 | End: 2025-08-11 | Stop reason: SURG

## 2025-08-11 RX ORDER — NALOXONE HYDROCHLORIDE 0.4 MG/ML
0.08 INJECTION, SOLUTION INTRAMUSCULAR; INTRAVENOUS; SUBCUTANEOUS ONCE AS NEEDED
Status: DISCONTINUED | OUTPATIENT
Start: 2025-08-11 | End: 2025-08-11

## 2025-08-11 RX ADMIN — SODIUM CHLORIDE, SODIUM LACTATE, POTASSIUM CHLORIDE, CALCIUM CHLORIDE: 600; 310; 30; 20 INJECTION, SOLUTION INTRAVENOUS at 08:25:00

## 2025-08-11 RX ADMIN — LIDOCAINE HYDROCHLORIDE 100 MG: 10 INJECTION, SOLUTION EPIDURAL; INFILTRATION; INTRACAUDAL; PERINEURAL at 08:20:00

## 2025-08-27 ENCOUNTER — OFFICE VISIT (OUTPATIENT)
Dept: INTERNAL MEDICINE CLINIC | Facility: CLINIC | Age: 50
End: 2025-08-27

## 2025-08-27 VITALS
WEIGHT: 227 LBS | BODY MASS INDEX: 41.77 KG/M2 | SYSTOLIC BLOOD PRESSURE: 128 MMHG | DIASTOLIC BLOOD PRESSURE: 78 MMHG | HEIGHT: 62 IN | HEART RATE: 108 BPM

## 2025-08-27 DIAGNOSIS — E78.5 HYPERLIPIDEMIA, UNSPECIFIED HYPERLIPIDEMIA TYPE: ICD-10-CM

## 2025-08-27 DIAGNOSIS — J45.20 MILD INTERMITTENT ASTHMA WITHOUT COMPLICATION (HCC): ICD-10-CM

## 2025-08-27 DIAGNOSIS — E11.9 TYPE 2 DIABETES MELLITUS WITHOUT COMPLICATION, WITHOUT LONG-TERM CURRENT USE OF INSULIN (HCC): Primary | ICD-10-CM

## 2025-08-27 DIAGNOSIS — E66.813 CLASS 3 SEVERE OBESITY DUE TO EXCESS CALORIES WITH SERIOUS COMORBIDITY AND BODY MASS INDEX (BMI) OF 40.0 TO 44.9 IN ADULT: ICD-10-CM

## 2025-08-27 LAB — HEMOGLOBIN A1C: 5.9 % (ref 4.3–5.6)

## 2025-08-27 PROCEDURE — 3061F NEG MICROALBUMINURIA REV: CPT | Performed by: INTERNAL MEDICINE

## 2025-08-27 PROCEDURE — 83036 HEMOGLOBIN GLYCOSYLATED A1C: CPT | Performed by: INTERNAL MEDICINE

## 2025-08-27 PROCEDURE — 3060F POS MICROALBUMINURIA REV: CPT | Performed by: INTERNAL MEDICINE

## 2025-08-27 PROCEDURE — 3008F BODY MASS INDEX DOCD: CPT | Performed by: INTERNAL MEDICINE

## 2025-08-27 PROCEDURE — 3044F HG A1C LEVEL LT 7.0%: CPT | Performed by: INTERNAL MEDICINE

## 2025-08-27 PROCEDURE — 99214 OFFICE O/P EST MOD 30 MIN: CPT | Performed by: INTERNAL MEDICINE

## 2025-08-27 PROCEDURE — 3078F DIAST BP <80 MM HG: CPT | Performed by: INTERNAL MEDICINE

## 2025-08-27 PROCEDURE — 3074F SYST BP LT 130 MM HG: CPT | Performed by: INTERNAL MEDICINE

## 2025-08-27 RX ORDER — ROSUVASTATIN CALCIUM 5 MG/1
5 TABLET, COATED ORAL NIGHTLY
Qty: 90 TABLET | Refills: 3 | Status: SHIPPED | OUTPATIENT
Start: 2025-08-27

## 2025-08-29 ENCOUNTER — TELEPHONE (OUTPATIENT)
Facility: CLINIC | Age: 50
End: 2025-08-29

## (undated) DIAGNOSIS — R73.9 ELEVATED BLOOD SUGAR: Primary | ICD-10-CM

## (undated) DIAGNOSIS — E55.9 VITAMIN D DEFICIENCY: ICD-10-CM

## (undated) DEVICE — Device

## (undated) DEVICE — KIT CLEAN ENDOKIT 1.1OZ GOWNX2

## (undated) DEVICE — TUBING SCT CLR 6FT .25IN MDVC

## (undated) DEVICE — KIT ENDO ORCAPOD 160/180/190

## (undated) DEVICE — KIT MFLD FOR SPEC COLL

## (undated) DEVICE — BLOCK BITE LG LUMN 20X27MM GRN DISP

## (undated) NOTE — LETTER
ASTHMA ACTION PLAN for Raquel Reilly     : 1975     Date: 18  Doctor:  Claudia Rm MD  Phone for doctor or clinic: [facility] , [address] [facility], [address] [place] [address]  [phone]           ACT Goal: 20 o Don't forget:  · Rinse mouth after using inhaler  · Use spacer for inhaler  · Remember to get your Flu vaccine every fall! [x] Asthma Action Plan reviewed with the caregiver and patient, and a copy of the plan was given to the patient/caregiver.    []

## (undated) NOTE — LETTER
ASTHMA ACTION PLAN for Jose F Tijerina     : 1975     Date: 19  Doctor:  Edwadro Ireland MD  Phone for doctor or clinic: [facility], [address] [facility], [address] [place] [address]  [phone]           ACT Goal: 20 o Can't talk well Medicine How much to take When to take it    If your symptoms do not improve in ONE hour -  go to the emergency room or call 911 immediately! If symptoms improve, call office for appointment immediately.     Albuterol inhaler 2 puffs every 2

## (undated) NOTE — LETTER
3/24/2021              [facility]        [address] [facility] [address] [facility]        [place] [id number]         To Whom It May Concern,    My patient Anton Hill, 9/9/1975) qualifies for [place] 1b [place] Vacc[place].       Sincerely,    Stephanie Castellanos MD  88

## (undated) NOTE — MR AVS SNAPSHOT
After Visit Summary   10/23/2020    Shabnam Yeung    MRN: [id number]           Visit Information     Date & Time  10/23/2020  2:00 PM Provider  Chandler Romero MD [address] [place] [address], [address] [place] [address],[address], [employer]  Phone  [phone] If you receive a survey from [employer], please take a few minutes to complete it and provide feedback. We strive to deliver the best patient experience and are looking for ways to make improvements. Your feedback will help us do so.  For more information EMERGENCY ROOM Life-threatening emergencies needing immediate intervention at a hospital emergency room.  Average cost  $2,300*   *Cost varies based on your insurance coverage  For more information about hours, locations or appointment options available at